# Patient Record
Sex: FEMALE | Race: BLACK OR AFRICAN AMERICAN | NOT HISPANIC OR LATINO | Employment: UNEMPLOYED | URBAN - METROPOLITAN AREA
[De-identification: names, ages, dates, MRNs, and addresses within clinical notes are randomized per-mention and may not be internally consistent; named-entity substitution may affect disease eponyms.]

---

## 2017-12-06 ENCOUNTER — HISTORICAL (OUTPATIENT)
Dept: ADMINISTRATIVE | Facility: HOSPITAL | Age: 22
End: 2017-12-06

## 2017-12-06 LAB
B-HCG FREE SERPL-ACNC: NORMAL MIU/ML
BILIRUB SERPL-MCNC: NEGATIVE MG/DL
BLOOD URINE, POC: NORMAL
CLARITY, POC UA: NORMAL
COLOR, POC UA: NORMAL
GLUCOSE UR QL STRIP: NEGATIVE
KETONES UR QL STRIP: NORMAL
LEUKOCYTE EST, POC UA: NEGATIVE
NITRITE, POC UA: NEGATIVE
PH, POC UA: 6.5
PROTEIN, POC: NEGATIVE
SPECIFIC GRAVITY, POC UA: 1
UROBILINOGEN, POC UA: NORMAL

## 2017-12-20 ENCOUNTER — HISTORICAL (OUTPATIENT)
Dept: ADMINISTRATIVE | Facility: HOSPITAL | Age: 22
End: 2017-12-20

## 2017-12-20 LAB
ABS NEUT (OLG): 9.06 X10(3)/MCL (ref 2.1–9.2)
BASOPHILS # BLD AUTO: 0.03 X10(3)/MCL
BASOPHILS NFR BLD AUTO: 0 % (ref 0–1)
BILIRUB SERPL-MCNC: NEGATIVE MG/DL
BLOOD URINE, POC: NORMAL
BUN SERPL-MCNC: 6 MG/DL (ref 7–18)
CALCIUM SERPL-MCNC: 8.9 MG/DL (ref 8.5–10.1)
CHLORIDE SERPL-SCNC: 104 MMOL/L (ref 98–107)
CLARITY, POC UA: CLEAR
CO2 SERPL-SCNC: 26 MMOL/L (ref 21–32)
COLOR, POC UA: YELLOW
CREAT SERPL-MCNC: 0.5 MG/DL (ref 0.6–1.3)
EOSINOPHIL # BLD AUTO: 0.03 10*3/UL
EOSINOPHIL NFR BLD AUTO: 0 % (ref 0–5)
ERYTHROCYTE [DISTWIDTH] IN BLOOD BY AUTOMATED COUNT: 13.6 % (ref 11.5–14.5)
GLUCOSE SERPL-MCNC: 82 MG/DL (ref 74–106)
GLUCOSE UR QL STRIP: NEGATIVE
HBV SURFACE AG SERPL QL IA: NEGATIVE
HCT VFR BLD AUTO: 37.7 % (ref 35–46)
HCV AB SERPL QL IA: NONREACTIVE
HGB BLD-MCNC: 12.4 GM/DL (ref 12–16)
HIV 1+2 AB+HIV1 P24 AG SERPL QL IA: NONREACTIVE
IMM GRANULOCYTES # BLD AUTO: 0.04 10*3/UL
IMM GRANULOCYTES NFR BLD AUTO: 0 %
KETONES UR QL STRIP: NEGATIVE
LEUKOCYTE EST, POC UA: NEGATIVE
LYMPHOCYTES # BLD AUTO: 1.64 X10(3)/MCL
LYMPHOCYTES NFR BLD AUTO: 14 % (ref 15–40)
MCH RBC QN AUTO: 29.9 PG (ref 26–34)
MCHC RBC AUTO-ENTMCNC: 32.9 GM/DL (ref 31–37)
MCV RBC AUTO: 90.8 FL (ref 80–100)
MONOCYTES # BLD AUTO: 0.63 X10(3)/MCL
MONOCYTES NFR BLD AUTO: 6 % (ref 4–12)
NEUTROPHILS # BLD AUTO: 9.06 X10(3)/MCL
NEUTROPHILS NFR BLD AUTO: 79 X10(3)/MCL
NITRITE, POC UA: NEGATIVE
PLATELET # BLD AUTO: 340 X10(3)/MCL (ref 130–400)
PMV BLD AUTO: 10 FL (ref 7.4–10.4)
POTASSIUM SERPL-SCNC: 4 MMOL/L (ref 3.5–5.1)
PROTEIN, POC: NORMAL
RBC # BLD AUTO: 4.15 X10(6)/MCL (ref 4–5.2)
SODIUM SERPL-SCNC: 138 MMOL/L (ref 136–145)
SPECIFIC GRAVITY, POC UA: 1.01
T PALLIDUM AB SER QL: NONREACTIVE
UROBILINOGEN, POC UA: NORMAL
WBC # SPEC AUTO: 11.4 X10(3)/MCL (ref 4.5–11)

## 2017-12-22 LAB — FINAL CULTURE: NORMAL

## 2018-01-15 LAB
BILIRUB SERPL-MCNC: NEGATIVE MG/DL
BLOOD URINE, POC: NEGATIVE
CLARITY, POC UA: CLEAR
COLOR, POC UA: YELLOW
GLUCOSE UR QL STRIP: NEGATIVE
KETONES UR QL STRIP: NEGATIVE
LEUKOCYTE EST, POC UA: NEGATIVE
NITRITE, POC UA: NEGATIVE
PH, POC UA: 6.5
PROTEIN, POC: NEGATIVE
SPECIFIC GRAVITY, POC UA: 1.01
UROBILINOGEN, POC UA: NORMAL

## 2018-02-16 ENCOUNTER — HISTORICAL (OUTPATIENT)
Dept: ADMINISTRATIVE | Facility: HOSPITAL | Age: 23
End: 2018-02-16

## 2018-02-16 LAB
ABS NEUT (OLG): 8.4 X10(3)/MCL (ref 2.1–9.2)
BASOPHILS # BLD AUTO: 0.03 X10(3)/MCL
BASOPHILS NFR BLD AUTO: 0 % (ref 0–1)
BILIRUB SERPL-MCNC: NEGATIVE MG/DL
BLOOD URINE, POC: NORMAL
CLARITY, POC UA: NORMAL
COLOR, POC UA: YELLOW
EOSINOPHIL # BLD AUTO: 0.07 10*3/UL
EOSINOPHIL NFR BLD AUTO: 1 % (ref 0–5)
ERYTHROCYTE [DISTWIDTH] IN BLOOD BY AUTOMATED COUNT: 13.3 % (ref 11.5–14.5)
GLUCOSE UR QL STRIP: NEGATIVE
HCT VFR BLD AUTO: 34.6 % (ref 35–46)
HGB BLD-MCNC: 12 GM/DL (ref 12–16)
IMM GRANULOCYTES # BLD AUTO: 0.05 10*3/UL
IMM GRANULOCYTES NFR BLD AUTO: 0 %
KETONES UR QL STRIP: NEGATIVE
LEUKOCYTE EST, POC UA: NORMAL
LYMPHOCYTES # BLD AUTO: 1.99 X10(3)/MCL
LYMPHOCYTES NFR BLD AUTO: 18 % (ref 15–40)
MCH RBC QN AUTO: 31.1 PG (ref 26–34)
MCHC RBC AUTO-ENTMCNC: 34.7 GM/DL (ref 31–37)
MCV RBC AUTO: 89.6 FL (ref 80–100)
MONOCYTES # BLD AUTO: 0.7 X10(3)/MCL
MONOCYTES NFR BLD AUTO: 6 % (ref 4–12)
NEUTROPHILS # BLD AUTO: 8.4 X10(3)/MCL
NEUTROPHILS NFR BLD AUTO: 75 X10(3)/MCL
NITRITE, POC UA: NEGATIVE
PH, POC UA: 6.5
PLATELET # BLD AUTO: 292 X10(3)/MCL (ref 130–400)
PMV BLD AUTO: 10 FL (ref 7.4–10.4)
PROTEIN, POC: NORMAL
RBC # BLD AUTO: 3.86 X10(6)/MCL (ref 4–5.2)
SPECIFIC GRAVITY, POC UA: 1.01
UROBILINOGEN, POC UA: NORMAL
WBC # SPEC AUTO: 11.2 X10(3)/MCL (ref 4.5–11)

## 2019-04-18 ENCOUNTER — HISTORICAL (OUTPATIENT)
Dept: ADMINISTRATIVE | Facility: HOSPITAL | Age: 24
End: 2019-04-18

## 2022-04-10 ENCOUNTER — HISTORICAL (OUTPATIENT)
Dept: ADMINISTRATIVE | Facility: HOSPITAL | Age: 27
End: 2022-04-10
Payer: MEDICAID

## 2022-04-27 VITALS
BODY MASS INDEX: 31.08 KG/M2 | WEIGHT: 198 LBS | HEIGHT: 67 IN | OXYGEN SATURATION: 99 % | SYSTOLIC BLOOD PRESSURE: 115 MMHG | DIASTOLIC BLOOD PRESSURE: 74 MMHG

## 2022-04-30 NOTE — PROGRESS NOTES
Health Maintenance     Pending (in the next year)        OverDue           Influenza Vaccine due  10/02/17  and every 1  year(s)        Due            Alcohol Misuse Screening due  12/20/17  and every 1  year(s)           Cervical Cancer Screening due  12/20/17  Variable frequency           Depression Screening due  12/20/17  and every 1  year(s)           Tetanus Vaccine due  12/20/17  and every 10  year(s)     Satisfied (in the past 1 year)        Satisfied            Blood Pressure Screening on  12/20/17.  Satisfied by Nicole Mascorro RN           Body Mass Index Check on  12/20/17.  Satisfied by Nicole Mascorro RN           Obesity Screening on  12/20/17.  Satisfied by Nicole Mascorro RN

## 2022-04-30 NOTE — PROGRESS NOTES
Patient:   Quintin Heredia             MRN: [id number]            FIN: [id number]               Age:   22 years     Sex:  Female     :  1995   Associated Diagnoses:   None   Author:   Jon Saul MD      Physician: CARLOS  Reason for Exam: INITIAL PRENATAL VISIT; DATING US  :1  Parity: 0  LMP: 10/18/2017  Gestational Age: 9w0d  EDC: 2018    Examination:  ABIMAEL: ADEQUATE  Fetal Tone:   Fetal Movement:   Fetal Heart Rate: 160  Fetus: SINGLE  Presentation:   Placenta:       Position: POSTERIOR      Grade:     Measurement:  CRL: 1.27cm  EGA: 7w3d  EDC: 2018    Comments: Early 1st trimester intrauterine pregnancy measuring CRL: 1.27cm; EGA: 7w3d; and an EDC of 2018. FHR: 160. Viable pregnancy.  ASSIGN EDC: 2018          This document has images

## 2022-04-30 NOTE — PROGRESS NOTES
Patient:   Quintin Heredia             MRN: [id number]            FIN: [id number]               Age:   22 years     Sex:  Female     :  1995   Associated Diagnoses:   IUP (intrauterine pregnancy), incidental   Author:   Efrain Miller MD      Chief Complaint   Pregnant      History of Present Illness     21 yo AA  @ 7wks3 d by 7wk3d U/S only (EDC 8/5/15)    New Issues:  mild nausea without vomiting    Chronic Issues: none      Histories   Gestational History:   - G1: Current   - G2: _   - G3: _   - G4: _  Gyn History:    - LMP:  UNSURE   - Age at menarche: 14 years   - Menstrual hx: regular, 5day cycles   - History of birth control: _    - History of STDs and/or Abnormal PAPs: _    Past Medical History:  noncontributory  Surgical History: none  Family History: father with HTN; 1/2 sister with DM (in 20s)  Social History:   GYNB HX:  prev normal PAP subjectively 2017; denies previous STIs; denies cutaneous genital lesions ever  Medications: possible early metronidazole for BV      Past Medical History:    No active or resolved past medical history items have been selected or recorded.   Family History:    No family history items have been selected or recorded.   Procedure history:    No active procedure history items have been selected or recorded.   Social History        Social & Psychosocial Habits    Alcohol  2017  Use: Never    Home/Environment  2016  Living situation: Home/Independent    Substance Abuse  2017  Use: Never    Tobacco    Comment: Denies - 2016 19:13 - LeJeune RN, [place] Jones        Health Status   Allergies:    Allergic Reactions (All)  No Known Allergies,    Allergies (1) Active Reaction  No Known Allergies None Documented     Current medications:  (Selected)   Prescriptions  Prescribed  Prenatal Plus oral tablet: 1 tab(s), Oral, Daily, [facility] Formulary Sub. with Generic Brand, # 90 tab(s), 3 Refill(s),    Home Medications (1) Active  Prenatal Plus  oral tablet 1 tab(s), Oral, Daily     Problem list:    All Problems  Caffeine user / SNOMED CT 3472594471 / Confirmed  Problem added by Discern Expert  First-trimester bleeding / SNOMED CT 40092252 / Confirmed  Encounter to determine fetal viability of pregnancy / SNOMED CT 10317458 / Confirmed  Pregnant / SNOMED CT 561595401 / Confirmed  Canceled: No Chronic Problems / [employer]ner NKP,    Active Problems (4)  Caffeine user   Encounter to determine fetal viability of pregnancy   First-trimester bleeding   Pregnant         Review of Systems     Antepartum specific     - Fetal movements: neg   - Vaginal bleeding: none since LMP subjectively   - Vaginal discharge:  slight, brown           Antepartum specific     denies: dysuira/ abnormal discharge /vag bleeding/ pelvic or abd pain /   reports: nause and vomiting , mild HA intermittently      Constitutional:  Fatigue, No fever, No chills.    Ear/Nose/Mouth/Throat:  Negative except as documented in history of present illness.    Respiratory:  Shortness of breath, No cough, No wheezing.    Cardiovascular:  No chest pain, No palpitations, No peripheral edema.    Gastrointestinal:  No nausea, No vomiting.    Genitourinary:  Negative except as documented in history of present illness, No dysuria, No lesions.    Gynecologic:  Negative except as documented in history of present illness.    Musculoskeletal:  No back pain, No joint pain, No muscle pain.    Integumentary:  Negative except as documented in history of present illness, No rash, No skin lesion.    Psychiatric:  Negative except as documented in history of present illness, No anxiety, No depression, Not suicidal.       Physical Examination   Vital Signs   12/20/2017 9:18 CST      Temperature Oral          36.7 DegC                             Temperature Oral (calculated)             98.06 DegF                             Peripheral Pulse Rate     87 bpm                             Respiratory Rate          18 br/min                              SpO2                      100 %                             Systolic Blood Pressure   130 mmHg                             Diastolic Blood Pressure  74 mmHg     Measurements from flowsheet : Measurements   12/20/2017 9:18 CST      Weight Dosing             90.26 kg                             Weight Measured           90.26 kg                             Weight Measured and Calculated in Lbs     198.99 lb                             Height/Length Dosing      170 cm                             Height/Length Measured    170 cm                             BSA Measured              2.06 m2                             Body Mass Index Measured  31.23 kg/m2     General:  Alert and oriented, No acute distress.    Eye:  Extraocular movements are intact, Normal conjunctiva.    HENT:  Normocephalic, Oral mucosa is moist.    Neck:  Supple, No thyromegaly.    Respiratory:  Lungs are clear to auscultation, Respirations are non-labored, Breath sounds are equal.    Cardiovascular:  Normal rate, Regular rhythm, Good pulses equal in all extremities, Normal peripheral perfusion, No edema.    Gastrointestinal:  Soft, Non-tender, Normal bowel sounds, gravid, gravid.    Genitourinary:          Labia: no lesions.         Cervix: Mucosa ( No inflammation ), no blood per os, No lesions.    Obstetric Exam     Uterus: fundal height, consistent with gestational age.     Rivers/ Baby A fetal evaluation: fetal movement, heart tones, assessment of fetal heart tracing.     Perineum.     Vulva.     Vagina: discharge (small amount, brown, not white), no lesions.     Cervix.     No contractions noted.     Musculoskeletal:  Normal strength, Normal gait.    Integumentary:  Warm, Dry, No rash.    Neurologic:  Alert, Oriented, No focal deficits, Normal deep tendon reflexes.    Cognition and Speech:  Oriented, Speech clear and coherent.    Psychiatric:  Cooperative, Appropriate mood & affect, Normal judgment, Non-suicidal.        Health Maintenance      Health Maintenance     Pending (in the next year)        OverDue           Influenza Vaccine due  10/02/17  and every 1  year(s)        Due            Alcohol Misuse Screening due  12/20/17  and every 1  year(s)           Cervical Cancer Screening due  12/20/17  Variable frequency           Depression Screening due  12/20/17  and every 1  year(s)           Tetanus Vaccine due  12/20/17  and every 10  year(s)     Satisfied (in the past 1 year)        Satisfied            Blood Pressure Screening on  12/20/17.  Satisfied by Nicole Mascorro RN           Body Mass Index Check on  12/20/17.  Satisfied by Nicole Mascorro RN.           Obesity Screening on  12/20/17.  Satisfied by Nicole Mascorro RN          Review / Management   Results review:  Lab results   12/20/2017 8:45 CST      Urine Color Urine Dipstick                Yellow                             Urine Appearance Urine Dipstick           Clear                             Specific Gravity Urine Dipstick           1.010                             Blood Urine Dipstick      Trace                             Glucose Urine Dipstick    Negative                             Ketones Urine Dipstick    Negative                             Protein Urine Dipstick    Trace                             Bilirubin Urine Dipstick  Negative                             Urobilinogen Urine Dipstick               0.2 mg/dl                             Leukocytes Urine Dipstick Negative                             Nitrite Urine Dipstick    Negative  .    Laboratory Results     Initial OB Labs(12/20/17):   - (12/7/17) Blood Type and Rh: O pos   - Antibody Screen: _   - CBC H/H: _   - HIV: _   - RPR: _   - GC: _   - CT: _   - HBsAg: _   - HCVAb: _   - Rubella: _   - Varicella: _   -  Culture: _   - Sickle Cell Screen: _   - PAP: _   - Influenza vaccine date: _    15-20 Weeks Lab    - Quad Screen: _    28 Week Lab    - 1H GTT: _    - Rhogam: _    - Date of  Tdap: _    - CBC H/H: _    - RPR: _    36 Week Lab    - CBC H/H: _    - RPR: _    - GBS Culture: _    - HIV: _    - Urine: GC: _      Radiology results      Ultrasound(s):        - 17 Initial U/S: assign EDC 8/5/15        - 20wk Anatomy scan:      Documentation reviewed:          -POC Urine Dip: reviewed at time of interview .       Impression and Plan   Diagnosis     IUP (intrauterine pregnancy), incidental ([id number]).       23 yo  @ 7wks3 d by 7wk3d U/S only (EDC 8/5/15)     1. Intrauterine Pregnancy    - PNVs perscribed electonically 17    - Urine dip:  1.010 tr prot, neg nitr, neg LE    - f/u 17  initial labs: ordered/PENDING          DISPO  - Assign to LOW RISK  PCP; RTC in 4wks  - NEEDS INFLU VACC  - anticipate     educated this visit: safe meds in pregnancy, PNVit compliance, complications of first trimester  strict ED precautions given for :ectopic/ SAB / VB/ pyelo /  dehydration / unable to tolerate po/ pelvic or abdominal pain

## 2022-04-30 NOTE — PROGRESS NOTES
Patient:   Quintin Heredia             MRN: [id number]            FIN: [id number]               Age:   22 years     Sex:  Female     :  1995   Associated Diagnoses:   None   Author:   Carlos ARTHUR, Jon EASON      Physician: CARLOS  Reason for Exam: 1ST TRIMESTER BLEEDING  : 1  Parity: 0  LMP: 10/18/2017  Gestational Age 7w0d  EDC: 2018    Examination:    Sac size: 1.36cm  EGA: 5w4d  CRL: no fetal pole identified; no YS identified    Comments: Very early 1st trimester intrauterine pregnancy. No fetal pole identified. GS measures 1.36cm and EGA of 5w4d. Recommend repeat ultrasound in 10-14days; quantitative beta hCG as clinically indicated.          This document has images

## 2022-09-06 ENCOUNTER — HOSPITAL ENCOUNTER (EMERGENCY)
Facility: HOSPITAL | Age: 27
Discharge: HOME | End: 2022-09-06
Attending: INTERNAL MEDICINE
Payer: MEDICAID

## 2022-09-06 VITALS
HEIGHT: 70 IN | TEMPERATURE: 98 F | HEART RATE: 75 BPM | BODY MASS INDEX: 30.17 KG/M2 | RESPIRATION RATE: 18 BRPM | WEIGHT: 210.75 LBS | OXYGEN SATURATION: 99 % | SYSTOLIC BLOOD PRESSURE: 120 MMHG | DIASTOLIC BLOOD PRESSURE: 77 MMHG

## 2022-09-06 DIAGNOSIS — R10.84 GENERALIZED ABDOMINAL PAIN: Primary | ICD-10-CM

## 2022-09-06 LAB
ALBUMIN SERPL-MCNC: 3.4 GM/DL (ref 3.5–5)
ALBUMIN/GLOB SERPL: 0.9 RATIO (ref 1.1–2)
ALP SERPL-CCNC: 119 UNIT/L (ref 40–150)
ALT SERPL-CCNC: 9 UNIT/L (ref 0–55)
APPEARANCE UR: CLEAR
AST SERPL-CCNC: 12 UNIT/L (ref 5–34)
B-HCG SERPL QL: NEGATIVE
BACTERIA #/AREA URNS AUTO: ABNORMAL /HPF
BASOPHILS # BLD AUTO: 0.02 X10(3)/MCL (ref 0–0.2)
BASOPHILS NFR BLD AUTO: 0.2 %
BILIRUB UR QL STRIP.AUTO: NEGATIVE MG/DL
BILIRUBIN DIRECT+TOT PNL SERPL-MCNC: 0.1 MG/DL
BUN SERPL-MCNC: 6 MG/DL (ref 7–18.7)
CALCIUM SERPL-MCNC: 9.4 MG/DL (ref 8.4–10.2)
CHLORIDE SERPL-SCNC: 107 MMOL/L (ref 98–107)
CO2 SERPL-SCNC: 23 MMOL/L (ref 22–29)
COLOR UR AUTO: YELLOW
CREAT SERPL-MCNC: 0.68 MG/DL (ref 0.55–1.02)
EOSINOPHIL # BLD AUTO: 0.14 X10(3)/MCL (ref 0–0.9)
EOSINOPHIL NFR BLD AUTO: 1.4 %
ERYTHROCYTE [DISTWIDTH] IN BLOOD BY AUTOMATED COUNT: 12.3 % (ref 11.5–17)
GFR SERPLBLD CREATININE-BSD FMLA CKD-EPI: >60 MLS/MIN/1.73/M2
GLOBULIN SER-MCNC: 3.6 GM/DL (ref 2.4–3.5)
GLUCOSE SERPL-MCNC: 116 MG/DL (ref 74–100)
GLUCOSE UR QL STRIP.AUTO: NEGATIVE MG/DL
HCT VFR BLD AUTO: 35.2 % (ref 37–47)
HGB BLD-MCNC: 11.9 GM/DL (ref 12–16)
IMM GRANULOCYTES # BLD AUTO: 0.02 X10(3)/MCL (ref 0–0.04)
IMM GRANULOCYTES NFR BLD AUTO: 0.2 %
KETONES UR QL STRIP.AUTO: NEGATIVE MG/DL
LEUKOCYTE ESTERASE UR QL STRIP.AUTO: NEGATIVE UNIT/L
LIPASE SERPL-CCNC: 20 U/L
LYMPHOCYTES # BLD AUTO: 3.19 X10(3)/MCL (ref 0.6–4.6)
LYMPHOCYTES NFR BLD AUTO: 32.8 %
MCH RBC QN AUTO: 30.5 PG (ref 27–31)
MCHC RBC AUTO-ENTMCNC: 33.8 MG/DL (ref 33–36)
MCV RBC AUTO: 90.3 FL (ref 80–94)
MONOCYTES # BLD AUTO: 0.53 X10(3)/MCL (ref 0.1–1.3)
MONOCYTES NFR BLD AUTO: 5.4 %
MUCOUS THREADS URNS QL MICRO: ABNORMAL /LPF
NEUTROPHILS # BLD AUTO: 5.8 X10(3)/MCL (ref 2.1–9.2)
NEUTROPHILS NFR BLD AUTO: 60 %
NITRITE UR QL STRIP.AUTO: NEGATIVE
PH UR STRIP.AUTO: 6.5 [PH]
PLATELET # BLD AUTO: 290 X10(3)/MCL (ref 130–400)
PMV BLD AUTO: 9.7 FL (ref 7.4–10.4)
POTASSIUM SERPL-SCNC: 3.6 MMOL/L (ref 3.5–5.1)
PROT SERPL-MCNC: 7 GM/DL (ref 6.4–8.3)
PROT UR QL STRIP.AUTO: NEGATIVE MG/DL
RBC # BLD AUTO: 3.9 X10(6)/MCL (ref 4.2–5.4)
RBC #/AREA URNS AUTO: ABNORMAL /HPF
RBC UR QL AUTO: ABNORMAL UNIT/L
SODIUM SERPL-SCNC: 139 MMOL/L (ref 136–145)
SP GR UR STRIP.AUTO: >=1.03
SQUAMOUS #/AREA URNS AUTO: ABNORMAL /HPF
UROBILINOGEN UR STRIP-ACNC: 1 MG/DL
WBC # SPEC AUTO: 9.7 X10(3)/MCL (ref 4.5–11.5)
WBC #/AREA URNS AUTO: ABNORMAL /HPF

## 2022-09-06 PROCEDURE — 96374 THER/PROPH/DIAG INJ IV PUSH: CPT

## 2022-09-06 PROCEDURE — 63600175 PHARM REV CODE 636 W HCPCS: Performed by: INTERNAL MEDICINE

## 2022-09-06 PROCEDURE — 85025 COMPLETE CBC W/AUTO DIFF WBC: CPT | Performed by: INTERNAL MEDICINE

## 2022-09-06 PROCEDURE — 36415 COLL VENOUS BLD VENIPUNCTURE: CPT | Performed by: INTERNAL MEDICINE

## 2022-09-06 PROCEDURE — 96375 TX/PRO/DX INJ NEW DRUG ADDON: CPT

## 2022-09-06 PROCEDURE — 96361 HYDRATE IV INFUSION ADD-ON: CPT

## 2022-09-06 PROCEDURE — 81001 URINALYSIS AUTO W/SCOPE: CPT | Performed by: INTERNAL MEDICINE

## 2022-09-06 PROCEDURE — 25000003 PHARM REV CODE 250: Performed by: INTERNAL MEDICINE

## 2022-09-06 PROCEDURE — 80053 COMPREHEN METABOLIC PANEL: CPT | Performed by: INTERNAL MEDICINE

## 2022-09-06 PROCEDURE — 99285 EMERGENCY DEPT VISIT HI MDM: CPT | Mod: 25

## 2022-09-06 PROCEDURE — 81025 URINE PREGNANCY TEST: CPT | Performed by: INTERNAL MEDICINE

## 2022-09-06 PROCEDURE — 83690 ASSAY OF LIPASE: CPT | Performed by: INTERNAL MEDICINE

## 2022-09-06 RX ORDER — ONDANSETRON 2 MG/ML
4 INJECTION INTRAMUSCULAR; INTRAVENOUS
Status: COMPLETED | OUTPATIENT
Start: 2022-09-06 | End: 2022-09-06

## 2022-09-06 RX ORDER — KETOROLAC TROMETHAMINE 30 MG/ML
15 INJECTION, SOLUTION INTRAMUSCULAR; INTRAVENOUS
Status: COMPLETED | OUTPATIENT
Start: 2022-09-06 | End: 2022-09-06

## 2022-09-06 RX ADMIN — ONDANSETRON 4 MG: 2 INJECTION INTRAMUSCULAR; INTRAVENOUS at 03:09

## 2022-09-06 RX ADMIN — KETOROLAC TROMETHAMINE 15 MG: 30 INJECTION, SOLUTION INTRAMUSCULAR; INTRAVENOUS at 04:09

## 2022-09-06 RX ADMIN — SODIUM CHLORIDE 1000 ML: 9 INJECTION, SOLUTION INTRAVENOUS at 03:09

## 2022-09-06 NOTE — DISCHARGE INSTRUCTIONS
See a Gyn doctor for follow-up and to reevaluate and treat as necessary.  As soon as possible    We do not provide OB services at [facility].      Take medicines as prescribed    See your family doctor in one to 2 days for further evaluation, workup, and treatment as necessary    Avoid driving or operating machinery while taking medicines as some medicines might cause drowsiness and may cause problems. Also pain medicines have potential of being addictive  so use Pain meds specially Narcotics Sparingly.    The exam and treatment you received in Emergency Room was for an urgent problem and NOT INTENDED AS COMPLETE CARE. It is important that you FOLLOW UP with a doctor for ongoing care. If your symptoms become WORSE or you DO NOT IMPROVE and you are unable to reach your health care provider, you should RETURN to the emergency department. The Emergency Room doctor has provided a PRELIMINARY INTERPRETATION of all your STUDIES. A final interpretation may be done after you are discharged. IF A CHANGE in your diagnosis or treatment is needed WE WILL CONTACT YOU. It is critical that we have a CURRENT PHONE NUMBER FOR YOU.

## 2022-09-06 NOTE — ED PROVIDER NOTES
2022         3:31 AM    Source of History:  History obtained from the patient.       Chief complaint:  From Nurse Triage:  Abdominal Pain (Pt c/o abd pain and back pain since 10 pm.)    HPI:  Quintin Heredia is a 27 y.o. female presenting with Abdominal Pain (Pt c/o abd pain and back pain since 10 pm.)       Patient with history of the  AB2 on birth control comes to the emergency with complaint of cramping abdominal pain which started this afternoon.  She says although she does not get usually a period, but today she was spotting and then in the evening she started having cramps in the abdomen.  Denies any diarrhea, denies any vomiting, denies any other complaints whatsoever    Review of Systems   Constitutional symptoms:  No Fever. No Chills    Skin symptoms:  No Rash.    Eye symptoms:  No Visual disturbance reported.   ENMT symptoms:  No Sore throat,    Respiratory symptoms:  No Shortness of Breath, no Cough, no Wheezing.    Cardiovascular symptoms:  No Chest Pain, No Palpitations, No Tachycardia.    Gastrointestinal symptoms:  Abdominal cramping Pain,  Nausea, No Vomiting, No Diarrhea, No Constipation.    Genitourinary symptoms:  No Dysuria,  vaginal spotting  Musculoskeletal symptoms:  No Back pain,    Neurologic symptoms:  No Headache, No Dizziness.    Psychiatric symptoms:  No Anxiety, No Depression, No Substance Abuse.              Additional review of systems information: Patient Denies Any Other Complaints.  All Other Systems Reviewed With Patient And Negative.    ALLEGIES:  Review of patient's allergies indicates:  No Known Allergies    HOME MEDICINES:  No current facility-administered medications for this encounter.    Current Outpatient Medications:     ranitidine (ZANTAC) 150 MG tablet, Take 1 tablet (150 mg total) by mouth 2 (two) times daily., Disp: 60 tablet, Rfl: 11    PMH:  As per HPI and below:    Reviewed and updated in chart.    PAST MEDICAL HISTORY:  Past Medical History:   Diagnosis  Date    COVID-19 01/06/2022        PAST SURGICAL HISTORY:  History reviewed. No pertinent surgical history.    SOCIAL HISTORY:       FAMILY HISTORY:  History reviewed. No pertinent family history.     PROBLEM LIST:  There is no problem list on file for this patient.       PHYSICAL EXAM:      ED Triage Vitals [09/06/22 0310]   BP (!) 148/90   Pulse 81   Resp 20   Temp 98.3 °F (36.8 °C)   SpO2 99 %        Vital Signs: Reviewed As In Chart.  General:  Alert, No Cardiorespiratory Distress Noted.   Skin: Normal For Ethnic Origin  Eye:  Extraocular Movements Are Intact.   Cardiovascular:  Regular Rate And Rhythm, No Murmur, No Pedal Edema.    Respiratory:  Respirations Nonlabored, No Respiratory Distress, Good Bilateral Air Entry, No Rales, No Rhonchi.    Musculoskeletal:  No Gross Deformity Noted.   Gastrointestinal:  Soft, Non Distended, Tender in all 4 quadrants essentially, Normal Bowel Sounds.    Neurological:  Alert And Oriented To Person, Place, Time, And Situation, Normal Motor Observed, Normal Speech Observed.    Psychiatric:  Cooperative, Appropriate Mood & Affect.    INITIAL IMPRESSION/ DIFFERENTIAL DX:      MEDICAL DECISION MAKING:      Reviewed Nurses Note. Reviewed Vital Signs.     Reviewed Pertinent old records, History and updated as necessary.    27 y.o. female with Abdominal Pain (Pt c/o abd pain and back pain since 10 pm.)    Patient with abdominal pain cramping, spotting started today, is on birth control and usually she does not get periods.  Denies any other complaints.  She says the pain got better in between but it started cramping again and it is all over the abdomen and goes to the back.    ED WORKUP AND COURSE:  ED ORDERS:  Orders Placed This Encounter   Procedures    CT Renal Stone Study ABD Pelvis WO    CBC W/ AUTO DIFFERENTIAL    Comp. Metabolic Panel    Lipase    Urinalysis, Reflex to Urine Culture Urine, Clean Catch    CBC with Differential    Pregnancy, urine rapid    Urinalysis,  Microscopic    Diet NPO    Vital signs    Insert peripheral IV       Medications   sodium chloride 0.9% bolus 1,000 mL (0 mLs Intravenous Stopped 9/6/22 0445)   ondansetron injection 4 mg (4 mg Intravenous Given 9/6/22 0345)   ketorolac injection 15 mg (15 mg Intravenous Given 9/6/22 0422)                ED LABS ORDERED AND REVIEWED:  Admission on 09/06/2022   Component Date Value Ref Range Status    Sodium Level 09/06/2022 139  136 - 145 mmol/L Final    Potassium Level 09/06/2022 3.6  3.5 - 5.1 mmol/L Final    Chloride 09/06/2022 107  98 - 107 mmol/L Final    Carbon Dioxide 09/06/2022 23  22 - 29 mmol/L Final    Glucose Level 09/06/2022 116 (H)  74 - 100 mg/dL Final    Blood Urea Nitrogen 09/06/2022 6.0 (L)  7.0 - 18.7 mg/dL Final    Creatinine 09/06/2022 0.68  0.55 - 1.02 mg/dL Final    Calcium Level Total 09/06/2022 9.4  8.4 - 10.2 mg/dL Final    Protein Total 09/06/2022 7.0  6.4 - 8.3 gm/dL Final    Albumin Level 09/06/2022 3.4 (L)  3.5 - 5.0 gm/dL Final    Globulin 09/06/2022 3.6 (H)  2.4 - 3.5 gm/dL Final    Albumin/Globulin Ratio 09/06/2022 0.9 (L)  1.1 - 2.0 ratio Final    Bilirubin Total 09/06/2022 0.1  <=1.5 mg/dL Final    Alkaline Phosphatase 09/06/2022 119  40 - 150 unit/L Final    Alanine Aminotransferase 09/06/2022 9  0 - 55 unit/L Final    Aspartate Aminotransferase 09/06/2022 12  5 - 34 unit/L Final    eGFR 09/06/2022 >60  mls/min/1.73/m2 Final    Lipase Level 09/06/2022 20  <=60 U/L Final    Color, UA 09/06/2022 Yellow  Yellow, Colorless, Other, Clear Final    Appearance, UA 09/06/2022 Clear  Clear Final    Specific Gravity, UA 09/06/2022 >=1.030   Final    pH, UA 09/06/2022 6.5  5.0, 5.5, 6.0, 6.5, 7.0, 7.5, 8.0, 8.5 Final    Protein, UA 09/06/2022 Negative  Negative, 300  mg/dL Final    Glucose, UA 09/06/2022 Negative  Negative, Normal mg/dL Final    Ketones, UA 09/06/2022 Negative  Negative, +1, +2, +3, +4, +5, >=160, >=80 mg/dL Final    Blood, UA 09/06/2022 Trace-Intact (A)  Negative unit/L  Final    Bilirubin, UA 09/06/2022 Negative  Negative mg/dL Final    Urobilinogen, UA 09/06/2022 1.0  0.2, 1.0, Normal mg/dL Final    Nitrites, UA 09/06/2022 Negative  Negative Final    Leukocyte Esterase, UA 09/06/2022 Negative  Negative, 75  unit/L Final    WBC 09/06/2022 9.7  4.5 - 11.5 x10(3)/mcL Final    RBC 09/06/2022 3.90 (L)  4.20 - 5.40 x10(6)/mcL Final    Hgb 09/06/2022 11.9 (L)  12.0 - 16.0 gm/dL Final    Hct 09/06/2022 35.2 (L)  37.0 - 47.0 % Final    MCV 09/06/2022 90.3  80.0 - 94.0 fL Final    MCH 09/06/2022 30.5  27.0 - 31.0 pg Final    MCHC 09/06/2022 33.8  33.0 - 36.0 mg/dL Final    RDW 09/06/2022 12.3  11.5 - 17.0 % Final    Platelet 09/06/2022 290  130 - 400 x10(3)/mcL Final    MPV 09/06/2022 9.7  7.4 - 10.4 fL Final    Neut % 09/06/2022 60.0  % Final    Lymph % 09/06/2022 32.8  % Final    Mono % 09/06/2022 5.4  % Final    Eos % 09/06/2022 1.4  % Final    Basophil % 09/06/2022 0.2  % Final    Lymph # 09/06/2022 3.19  0.6 - 4.6 x10(3)/mcL Final    Neut # 09/06/2022 5.8  2.1 - 9.2 x10(3)/mcL Final    Mono # 09/06/2022 0.53  0.1 - 1.3 x10(3)/mcL Final    Eos # 09/06/2022 0.14  0 - 0.9 x10(3)/mcL Final    Baso # 09/06/2022 0.02  0 - 0.2 x10(3)/mcL Final    IG# 09/06/2022 0.02  0 - 0.04 x10(3)/mcL Final    IG% 09/06/2022 0.2  % Final    Beta hCG Qualitative, Urine 09/06/2022 Negative  Negative Final    Bacteria, UA 09/06/2022 Few (A)  None Seen, Rare, Occasional /HPF Final    Mucous, UA 09/06/2022 Small (A)  None Seen /LPF Final    RBC, UA 09/06/2022 3-5  None Seen, 0-2, 3-5, 0-5 /HPF Final    WBC, UA 09/06/2022 None Seen  None Seen, 0-2, 3-5, 0-5 /HPF Final    Squamous Epithelial Cells, UA 09/06/2022 Few (A)  None Seen, Rare, Occasional, Occ /HPF Final       RADIOLOGY STUDIES ORDERED AND REVIEWED:  Imaging Results              CT Renal Stone Study ABD Pelvis WO (Preliminary result)  Result time 09/06/22 04:39:33      Preliminary result by Frank Hackett Jr., MD (09/06/22 04:39:33)                    Narrative:    START OF REPORT:  Technique: CT of the abdomen and pelvis was performed with axial images as well as sagittal and coronal reconstruction images without intravenous contrast.    Comparison: None available.    Clinical History: Abdominal pain on both sides.    Dosage Information: Automated Exposure Control was utilized.    Findings:  Thorax:  Lungs: The visualized lung bases appear unremarkable.  Pleura: No effusions or thickening.  Heart: The heart size is within normal limits.  Abdomen:  Abdominal Wall: There is a small umbilical hernia which contains mesenteric fat.  Liver: The liver appears unremarkable.  Biliary System: No extrahepatic biliary duct dilatation is seen.  Gallbladder: The gallbladder appears unremarkable.  Pancreas: The pancreas appears unremarkable.  Spleen: The spleen appears unremarkable.  Adrenals: The adrenal glands appear unremarkable.  Kidneys: The kidneys appear unremarkable with no stones cysts masses or hydronephrosis.  Aorta: The abdominal aorta appears unremarkable.  IVC: Unremarkable.  Bowel:  Esophagus: The visualized esophagus appears unremarkable.  Stomach: The stomach appears unremarkable.  Duodenum: Unremarkable appearing duodenum.  Small Bowel: The small bowel appears unremarkable.  Colon: Nondistended.  Appendix: The appendix appears unremarkable.  Peritoneum: No intraperitoneal free air or ascites is seen.    Pelvis:  Bladder: The bladder is nondistended and cannot be definitively evaluated.  Female:  Uterus: The uterus appears unremarkable.  Ovaries: No adnexal masses are seen.    Bony structures:  Dorsal Spine: The visualized dorsal spine appears unremarkable.      Impression:  1. No acute intraabdominal or pelvic pathology is identified. Details as above.                          Preliminary result by Interface, Rad Results In (09/06/22 04:39:33)                   Narrative:    START OF REPORT:  Technique: CT of the abdomen and pelvis was performed with  axial images as well as sagittal and coronal reconstruction images without intravenous contrast.    Comparison: None available.    Clinical History: Abdominal pain on both sides.    Dosage Information: Automated Exposure Control was utilized.    Findings:  Thorax:  Lungs: The visualized lung bases appear unremarkable.  Pleura: No effusions or thickening.  Heart: The heart size is within normal limits.  Abdomen:  Abdominal Wall: There is a small umbilical hernia which contains mesenteric fat.  Liver: The liver appears unremarkable.  Biliary System: No extrahepatic biliary duct dilatation is seen.  Gallbladder: The gallbladder appears unremarkable.  Pancreas: The pancreas appears unremarkable.  Spleen: The spleen appears unremarkable.  Adrenals: The adrenal glands appear unremarkable.  Kidneys: The kidneys appear unremarkable with no stones cysts masses or hydronephrosis.  Aorta: The abdominal aorta appears unremarkable.  IVC: Unremarkable.  Bowel:  Esophagus: The visualized esophagus appears unremarkable.  Stomach: The stomach appears unremarkable.  Duodenum: Unremarkable appearing duodenum.  Small Bowel: The small bowel appears unremarkable.  Colon: Nondistended.  Appendix: The appendix appears unremarkable.  Peritoneum: No intraperitoneal free air or ascites is seen.    Pelvis:  Bladder: The bladder is nondistended and cannot be definitively evaluated.  Female:  Uterus: The uterus appears unremarkable.  Ovaries: No adnexal masses are seen.    Bony structures:  Dorsal Spine: The visualized dorsal spine appears unremarkable.      Impression:  1. No acute intraabdominal or pelvic pathology is identified. Details as above.                                        ED COURSE AND REEVALUATIONS:  Vitals:    09/06/22 0310   BP: (!) 148/90   Pulse: 81   Resp: 20   Temp: 98.3 °F (36.8 °C)       PROCEDURES PERFORMED IN ED:  Procedures    ED Course as of 09/06/22 0510   Tue Sep 06, 2022   0503 Patient's workup in the emergency  room is essentially negative for any acute abnormality at this time, she has diffuse cramping abdominal pain which goes to the back.  She has also spotting, it may be just dysmenorrhea.  She does not have any other particular intervention needed at this time I will put her on ranitidine for acid reduction and see if that helps her.  I advised her to see her family doctor and gyn for follow-up. [GQ]      ED Course User Index  [GQ] Teressa Ward MD              DIAGNOSTIC IMPRESSION:      1. Generalized abdominal pain         ED Disposition Condition    Discharge Stable               Medication List        START taking these medications      ranitidine 150 MG tablet  Commonly known as: ZANTAC  Take 1 tablet (150 mg total) by mouth 2 (two) times daily.               Where to Get Your Medications        These medications were sent to [id number] - [place] - [address]  [address] [address]      Phone: [phone]   ranitidine 150 MG tablet           Follow-up Information       PMD In 2 days.                              ED Prescriptions       Medication Sig Dispense Start Date End Date Auth. Provider    ranitidine (ZANTAC) 150 MG tablet Take 1 tablet (150 mg total) by mouth 2 (two) times daily. 60 tablet 9/6/2022 9/6/2023 Teressa Ward MD          Follow-up Information       Follow up With Specialties Details Why Contact Info    PMD  In 2 days                 Teressa Ward MD  09/06/22 0079

## 2022-09-17 ENCOUNTER — HISTORICAL (OUTPATIENT)
Dept: ADMINISTRATIVE | Facility: HOSPITAL | Age: 27
End: 2022-09-17
Payer: MEDICAID

## 2023-01-05 ENCOUNTER — HOSPITAL ENCOUNTER (OUTPATIENT)
Dept: RADIOLOGY | Facility: HOSPITAL | Age: 28
Discharge: HOME | End: 2023-01-05
Attending: FAMILY MEDICINE
Payer: MEDICAID

## 2023-01-05 DIAGNOSIS — Z11.1 DELAYED REACTION PPD: ICD-10-CM

## 2023-01-05 PROCEDURE — 71046 X-RAY EXAM CHEST 2 VIEWS: CPT | Mod: TC

## 2023-05-16 ENCOUNTER — HOSPITAL ENCOUNTER (EMERGENCY)
Facility: HOSPITAL | Age: 28
Discharge: LEFT BEFORE DISCHARGE | End: 2023-05-16
Payer: MEDICAID

## 2023-05-16 VITALS
HEART RATE: 88 BPM | WEIGHT: 213 LBS | SYSTOLIC BLOOD PRESSURE: 136 MMHG | RESPIRATION RATE: 16 BRPM | TEMPERATURE: 98 F | OXYGEN SATURATION: 100 % | BODY MASS INDEX: 32.28 KG/M2 | HEIGHT: 68 IN | DIASTOLIC BLOOD PRESSURE: 83 MMHG

## 2023-05-16 LAB
ALBUMIN SERPL-MCNC: 3.4 G/DL (ref 3.5–5)
ALBUMIN/GLOB SERPL: 0.9 RATIO (ref 1.1–2)
ALP SERPL-CCNC: 111 UNIT/L (ref 40–150)
ALT SERPL-CCNC: 84 UNIT/L (ref 0–55)
APPEARANCE UR: CLEAR
AST SERPL-CCNC: 65 UNIT/L (ref 5–34)
B-HCG SERPL QL: NEGATIVE
BACTERIA #/AREA URNS AUTO: NORMAL /HPF
BASOPHILS # BLD AUTO: 0.04 X10(3)/MCL
BASOPHILS NFR BLD AUTO: 0.4 %
BILIRUB UR QL STRIP.AUTO: NEGATIVE MG/DL
BILIRUBIN DIRECT+TOT PNL SERPL-MCNC: 0.2 MG/DL
BUN SERPL-MCNC: 7 MG/DL (ref 7–18.7)
CALCIUM SERPL-MCNC: 9.3 MG/DL (ref 8.4–10.2)
CHLORIDE SERPL-SCNC: 104 MMOL/L (ref 98–107)
CO2 SERPL-SCNC: 27 MMOL/L (ref 22–29)
COLOR UR: YELLOW
CREAT SERPL-MCNC: 0.74 MG/DL (ref 0.55–1.02)
EOSINOPHIL # BLD AUTO: 0.08 X10(3)/MCL (ref 0–0.9)
EOSINOPHIL NFR BLD AUTO: 0.8 %
ERYTHROCYTE [DISTWIDTH] IN BLOOD BY AUTOMATED COUNT: 12.9 % (ref 11.5–17)
GFR SERPLBLD CREATININE-BSD FMLA CKD-EPI: >60 MLS/MIN/1.73/M2
GLOBULIN SER-MCNC: 3.9 GM/DL (ref 2.4–3.5)
GLUCOSE SERPL-MCNC: 107 MG/DL (ref 74–100)
GLUCOSE UR QL STRIP.AUTO: NEGATIVE MG/DL
HCT VFR BLD AUTO: 36.9 % (ref 37–47)
HGB BLD-MCNC: 11.8 G/DL (ref 12–16)
IMM GRANULOCYTES # BLD AUTO: 0.03 X10(3)/MCL (ref 0–0.04)
IMM GRANULOCYTES NFR BLD AUTO: 0.3 %
KETONES UR QL STRIP.AUTO: ABNORMAL MG/DL
LEUKOCYTE ESTERASE UR QL STRIP.AUTO: ABNORMAL UNIT/L
LIPASE SERPL-CCNC: 14 U/L
LYMPHOCYTES # BLD AUTO: 2.79 X10(3)/MCL (ref 0.6–4.6)
LYMPHOCYTES NFR BLD AUTO: 28.7 %
MCH RBC QN AUTO: 30 PG (ref 27–31)
MCHC RBC AUTO-ENTMCNC: 32 G/DL (ref 33–36)
MCV RBC AUTO: 93.9 FL (ref 80–94)
MONOCYTES # BLD AUTO: 0.61 X10(3)/MCL (ref 0.1–1.3)
MONOCYTES NFR BLD AUTO: 6.3 %
NEUTROPHILS # BLD AUTO: 6.16 X10(3)/MCL (ref 2.1–9.2)
NEUTROPHILS NFR BLD AUTO: 63.5 %
NITRITE UR QL STRIP.AUTO: NEGATIVE
PH UR STRIP.AUTO: 7 [PH]
PLATELET # BLD AUTO: 336 X10(3)/MCL (ref 130–400)
PMV BLD AUTO: 9.6 FL (ref 7.4–10.4)
POTASSIUM SERPL-SCNC: 3.6 MMOL/L (ref 3.5–5.1)
PROT SERPL-MCNC: 7.3 GM/DL (ref 6.4–8.3)
PROT UR QL STRIP.AUTO: ABNORMAL MG/DL
RBC # BLD AUTO: 3.93 X10(6)/MCL (ref 4.2–5.4)
RBC #/AREA URNS AUTO: NORMAL /HPF
RBC UR QL AUTO: ABNORMAL UNIT/L
SODIUM SERPL-SCNC: 139 MMOL/L (ref 136–145)
SP GR UR STRIP.AUTO: 1.02
SQUAMOUS #/AREA URNS AUTO: NORMAL /HPF
UROBILINOGEN UR STRIP-ACNC: 2 MG/DL
WBC # SPEC AUTO: 9.71 X10(3)/MCL (ref 4.5–11.5)
WBC #/AREA URNS AUTO: NORMAL /HPF

## 2023-05-16 PROCEDURE — 81001 URINALYSIS AUTO W/SCOPE: CPT | Performed by: PHYSICIAN ASSISTANT

## 2023-05-16 PROCEDURE — 81025 URINE PREGNANCY TEST: CPT | Performed by: PHYSICIAN ASSISTANT

## 2023-05-16 PROCEDURE — 85025 COMPLETE CBC W/AUTO DIFF WBC: CPT | Performed by: PHYSICIAN ASSISTANT

## 2023-05-16 PROCEDURE — 99900041 HC LEFT WITHOUT BEING SEEN- EMERGENCY

## 2023-05-16 PROCEDURE — 83690 ASSAY OF LIPASE: CPT | Performed by: PHYSICIAN ASSISTANT

## 2023-05-16 PROCEDURE — 80053 COMPREHEN METABOLIC PANEL: CPT | Performed by: PHYSICIAN ASSISTANT

## 2023-05-16 NOTE — FIRST PROVIDER EVALUATION
"Medical screening examination initiated.  I have conducted a focused provider triage encounter, findings are as follows:    Chief Complaint   Patient presents with    Abdominal Pain     Abd pain for 20 min after vomitting X2     Brief history of present illness:  27 y.o. female presents to the ED with midline abdominal pain onset 20 min [place]LE[place] with vomiting x2. No meds taken. LMP at beginning of May    Vitals:    05/16/23 1519   BP: 136/83   Pulse: 88   Resp: 16   Temp: 97.6 °F (36.4 °C)   SpO2: 100%   Weight: 96.6 kg (213 lb)   Height: 5' 8" (1.727 m)       Pertinent physical exam: Awake, alert, ambulatory, non-labored respirations    Brief workup plan:  labs, UA    Preliminary workup initiated; this workup will be continued and followed by the physician or advanced practice provider that is assigned to the patient when roomed.  "

## 2023-05-17 ENCOUNTER — HOSPITAL ENCOUNTER (EMERGENCY)
Facility: HOSPITAL | Age: 28
Discharge: HOME | End: 2023-05-17
Attending: EMERGENCY MEDICINE
Payer: MEDICAID

## 2023-05-17 VITALS
DIASTOLIC BLOOD PRESSURE: 83 MMHG | TEMPERATURE: 98 F | SYSTOLIC BLOOD PRESSURE: 169 MMHG | BODY MASS INDEX: 31.78 KG/M2 | RESPIRATION RATE: 18 BRPM | OXYGEN SATURATION: 100 % | WEIGHT: 209 LBS | HEART RATE: 77 BPM

## 2023-05-17 DIAGNOSIS — K80.20 CALCULUS OF GALLBLADDER WITHOUT CHOLECYSTITIS WITHOUT OBSTRUCTION: ICD-10-CM

## 2023-05-17 DIAGNOSIS — K80.50 BILIARY COLIC: Primary | ICD-10-CM

## 2023-05-17 DIAGNOSIS — R10.9 ABDOMINAL PAIN: ICD-10-CM

## 2023-05-17 LAB
APPEARANCE UR: CLEAR
B-HCG SERPL QL: NEGATIVE
BACTERIA #/AREA URNS AUTO: ABNORMAL /HPF
BILIRUB UR QL STRIP.AUTO: ABNORMAL MG/DL
CAOX CRY URNS QL MICRO: ABNORMAL /HPF
COLOR UR: YELLOW
GLUCOSE UR QL STRIP.AUTO: ABNORMAL MG/DL
KETONES UR QL STRIP.AUTO: ABNORMAL MG/DL
LEUKOCYTE ESTERASE UR QL STRIP.AUTO: ABNORMAL UNIT/L
MUCOUS THREADS URNS QL MICRO: ABNORMAL /LPF
NITRITE UR QL STRIP.AUTO: NEGATIVE
PH UR STRIP.AUTO: 6 [PH]
PROT UR QL STRIP.AUTO: ABNORMAL MG/DL
RBC #/AREA URNS AUTO: ABNORMAL /HPF
RBC UR QL AUTO: ABNORMAL UNIT/L
SP GR UR STRIP.AUTO: >=1.03
SQUAMOUS #/AREA URNS AUTO: ABNORMAL /HPF
UROBILINOGEN UR STRIP-ACNC: 2 MG/DL
WBC #/AREA URNS AUTO: ABNORMAL /HPF

## 2023-05-17 PROCEDURE — 81001 URINALYSIS AUTO W/SCOPE: CPT | Performed by: EMERGENCY MEDICINE

## 2023-05-17 PROCEDURE — 87077 CULTURE AEROBIC IDENTIFY: CPT | Performed by: EMERGENCY MEDICINE

## 2023-05-17 PROCEDURE — 81025 URINE PREGNANCY TEST: CPT | Performed by: EMERGENCY MEDICINE

## 2023-05-17 PROCEDURE — 99284 EMERGENCY DEPT VISIT MOD MDM: CPT | Mod: 25

## 2023-05-17 NOTE — DISCHARGE INSTRUCTIONS
[place] diet lots of liquids    Return for any emergency problem    Please contact surgeon and make arrangements for evaluation and if needed a possible elective gallbladder removal

## 2023-05-17 NOTE — ED PROVIDER NOTES
Encounter Date: 2023       History     Chief Complaint   Patient presents with    Abdominal Pain     C/o intermittent abdominal pain & cramping for months. Pt had her birth control removed last week thinking that it was the problem but states that the pain continues. Pt reports vomiting x 4 yesterday due to the pain.     Patient presents emergency department with abdominal pain crampy discomfort she indicates mid epigastric area down to umbilicus suprapubic area.  It has been going on since 2022.  She says she saw her doctor once long time ago she can not remember she said she did come yesterday.  But she had to leave and go take care of her children get home from school so she just came back this morning after she dropped him off at school.  Ms. Heredia says she is not taken anything for this maybe some Tylenol 1 time it has not really changed not worse not better intermittently she has vomiting yesterday she did vomit 4 times.  She denies any associated fever at any time.  She denies abnormal vaginal bleeding or discharge.  She said she felt like this was related to her Norplant she had in her left arm so she had that removed last week but she still having the same symptoms.    Past medical history she denies any chronic medical problems.  She denies any daily medication    No known drug allergies    She had the nor plant in her left arm had it removed last week.  She said that is her only surgical history    No tobacco no alcohol no drugs said she is  nobody else at the home is sick with anything.  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    Patient has had a COVID vaccine and a flu shot no pneumonia no tetanus shots.      Primary healthcare provider Dr. Duque.   2 para 2 last menstrual cycle was May 3rd 5 days normal.      Family history mom and dad both alive both healthy.  No chronic medical issues.    Review of patient's allergies indicates:  No Known Allergies  Past Medical History:    Diagnosis Date    COVID-19 01/06/2022     History reviewed. No pertinent surgical history.  History reviewed. No pertinent family history.  Social History     Tobacco Use    Smoking status: Never    Smokeless tobacco: Never   Substance Use Topics    Alcohol use: Never    Drug use: Never     Review of Systems   Constitutional: Negative.  Negative for appetite change and unexpected weight change.   HENT: Negative.     Eyes: Negative.  Negative for visual disturbance.   Respiratory:  Negative for cough, chest tightness and stridor.    Cardiovascular:  Negative for chest pain.   Gastrointestinal:  Positive for abdominal pain, nausea and vomiting. Negative for abdominal distention, anal bleeding, blood in stool, constipation and diarrhea.   Endocrine: Negative.    Genitourinary:  Positive for difficulty urinating. Negative for dysuria, frequency, hematuria and urgency.   Musculoskeletal:  Positive for back pain (Sometimes it abdominal pain goes through to her back).   Skin: Negative.    Allergic/Immunologic: Negative.    Neurological: Negative.    Hematological: Negative.    Psychiatric/Behavioral: Negative.     All other systems reviewed and are negative.    Physical Exam     Initial Vitals [05/17/23 0841]   BP Pulse Resp Temp SpO2   (!) 169/83 77 18 97.6 °F (36.4 °C) 100 %      MAP       --         Physical Exam    Nursing note and vitals reviewed.  Constitutional: She appears well-developed and well-nourished.   No apparent distress   HENT:   Head: Normocephalic and atraumatic.   Right Ear: Tympanic membrane and external ear normal.   Left Ear: Tympanic membrane and external ear normal.   Nose: Nose normal.   Mouth/Throat: Oropharynx is clear and moist and mucous membranes are normal.   Eyes: EOM are normal. Pupils are equal, round, and reactive to light.   Neck: Neck supple. No thyromegaly present. No tracheal deviation present. No JVD present.   Normal range of motion.  Cardiovascular:  Normal rate, regular rhythm  and normal heart sounds.     Exam reveals no gallop and no friction rub.       No murmur heard.  Pulmonary/Chest: Breath sounds normal. No stridor. No respiratory distress. She has no wheezes. She has no rhonchi. She has no rales. She exhibits no tenderness.   Abdominal: Abdomen is soft. Bowel sounds are normal. She exhibits no distension and no mass. There is abdominal tenderness (Minimum direct epigastric area tenderness right upper quadrant left upper quadrant equally mildly tender).   2+ femoral pulses equal no pulsatile mass in the abdomen nontender suprapubic area   Genitourinary:    Genitourinary Comments: No CVA tenderness no midline thoracic cervical or lumbar sacral spine tenderness no step-off     Musculoskeletal:         General: No tenderness or edema. Normal range of motion.      Cervical back: Normal range of motion and neck supple.     Lymphadenopathy:     She has no cervical adenopathy.   Neurological: She is alert and oriented to person, place, and time. She has normal strength and normal reflexes. No cranial nerve deficit. GCS score is 15. GCS eye subscore is 4. GCS verbal subscore is 5. GCS motor subscore is 6.   Skin: Skin is warm and dry. Capillary refill takes 2 to 3 seconds. No rash noted.   Psychiatric: She has a normal mood and affect. Her behavior is normal. Judgment and thought content normal.       ED Course   Procedures  Labs Reviewed   URINALYSIS, REFLEX TO URINE CULTURE - Abnormal; Notable for the following components:       Result Value    Protein, UA 2+ (*)     Glucose, UA Trace (*)     Ketones, UA Trace (*)     Blood, UA 2+ (*)     Bilirubin, UA 3+ (*)     Urobilinogen, UA 2.0 (*)     Leukocyte Esterase, UA 1+ (*)     All other components within normal limits   URINALYSIS, MICROSCOPIC - Abnormal; Notable for the following components:    Bacteria, UA Few (*)     Mucous, UA Large (*)     Calcium Oxalate Crystals, UA Few (*)     RBC, UA 21-50 (*)     WBC, UA 51-99 (*)     Squamous  Epithelial Cells, UA Moderate (*)     All other components within normal limits   HCG QUALITATIVE URINE - Normal   CULTURE, URINE          Imaging Results              US Abdomen Complete (Final result)  Result time 05/17/23 11:43:48      Final result by Julio C Hudson MD (05/17/23 11:43:48)                   Impression:      1. Mildly distended gallbladder with multiple gallstones.  No wall thickening or pericholecystic fluid is seen.  Sonographic Romero sign is positive.  The common bile duct is not dilated.  A hepatobiliary scan would allow further evaluation  2. Less than optimal visualization of the pancreas      Electronically signed by: Julio C Hudson  Date:    05/17/2023  Time:    11:43               Narrative:    EXAMINATION:  US ABDOMEN COMPLETE    CLINICAL HISTORY:  , Unspecified abdominal pain.    COMPARISON:  None available    FINDINGS:  Multiple sonographic images reveal the pancreas to be less than optimally visualized.  The visualized portions of the aorta and inferior vena cava are normal in size. The gallbladder demonstrate multiple gallstones.  The gallbladder wall is not thickened.  No pericholecystic fluid is seen.  The gallbladder is mildly distended..  Romero sign is positive the common bile duct is not dilated. The liver is normal in size. Hepatic parenchyma is grossly within normal limits. No free fluid collection is seen. The kidneys are relatively symmetric in size.  No hydronephrosis is identified. The spleen is normal in size.                                       Medications - No data to display  Medical Decision Making:   Initial Assessment:   Patient came yesterday and left without being seen by blood work was drawn urine was clear ALT AST was slightly elevated CBC was benign.  Pregnancy test was negative  Black female awake oriented not toxic appearing adequate historian  Ear nose mouth throat unremarkable heart is regular rate and rhythm lungs are clear abdomen minimum direct  epigastric tenderness even less right and left upper quadrant tenderness no lower quadrant tenderness no rebound no CVA tenderness.  Extremities without clubbing or cyanosis edema neurological seems appropriate  Differential Diagnosis:   Biliary colic, gastritis, abdominal pain related to hormones nor plant, ectopic pregnancy, dysuria cystitis, hepatitis, pancreatitis, chronic abdominal pain unknown etiology  Clinical Tests:   Lab Tests: Ordered and Reviewed       <> Summary of Lab: Lab work from today looks like a contaminated clean catch with moderate squamous epithelial cells large amount of mucus few bacteria 21-50 reds and 51-99 whites.  Concentrated 1.03 negative nitrates 1+ leukocyte esterase.  Culture is being performed    Lab work done yesterday showed a white count of 9.71.  Platelet count of 369491 hemoglobin 11.8 hematocrit 36.9 lipase of 14 ALT AST is 65 ALT of 84 glucose of 107 otherwise chemistries were benign  ED Management:  Ultrasound is ordered.  After evaluation explained to the patient that the emergency department will look for any emergency problem this been going on for months we may not find a specific etiology  MDM  Problems addressed  Co-morbidities and/or factors adding to the complexity or risk for the patient:  None known  Problems addressed:  Abdominal pain for 7-8 months intermittent nausea and vomiting  Acute problem/illness or progression/exacerbation of chronic problem with potential threat to life/bodily dysfunction?:  Abdominal pain intermittent nausea and vomiting  Differential diagnoses/problems considered: see above     Amount and/or Complexity of Data Reviewed  Independent Historian: none (see above for summary)  External Data Reviewed: notes from previous ED visits and prior labs (see above for summary)  Risk and benefits of testing: discussed   Labs: Labs: ordered and reviewed  Radiology:Radiology: ordered and independent interpretation performed (see above or ED  course)  ECG/medicine tests:Radiology: ordered and independent interpretation performed (see above or ED course)  none    Risk  Prescription drug management   Shared decision making     Critical Care  none            ED Course as of 05/17/23 1309   Wed May 17, 2023   1305 I talked to the patient about gallstones I talked to the patient about biliary colic.  Patient tells me non not hurting right now said yes man when the stones move the not blocking anything the gallbladder they do not hurt I explained to her the importance of following up to see about an elective gallbladder removal before it makes her sick she is voiced her understanding.  She wanted know what causes gallstones I explained to her aside from sickle cell disease patients who get them very early.  I do not know of any pre-existing medical condition at least to this.  But I am not a gastroenterologist or surgeon [DM]      ED Course User Index  [DM] Julio C Dillard MD                 Clinical Impression:   Final diagnoses:  [R10.9] Abdominal pain  [K80.50] Biliary colic (Primary)  [K80.20] Calculus of gallbladder without cholecystitis without obstruction        ED Disposition Condition    Discharge Stable          ED Prescriptions    None       Follow-up Information       Follow up With Specialties Details Why Contact Info    Rose Duque MD Family Medicine In 2 days If symptoms worsen [address]  [place] [address]  [phone]      DIAMOND Barnes MD General Surgery  This is the general surgeon on-call the type of physician they can remove your gallbladder [address] [place] [address]  [phone]               Julio C Dillard MD  05/17/23 1310

## 2023-05-19 LAB — BACTERIA UR CULT: ABNORMAL

## 2023-07-25 DIAGNOSIS — O03.9 MISCARRIAGE: Primary | ICD-10-CM

## 2023-07-25 DIAGNOSIS — O03.89: ICD-10-CM

## 2023-07-28 ENCOUNTER — HOSPITAL ENCOUNTER (OUTPATIENT)
Dept: RADIOLOGY | Facility: HOSPITAL | Age: 28
Discharge: HOME | End: 2023-07-28
Attending: PEDIATRICS
Payer: MEDICAID

## 2023-07-28 DIAGNOSIS — O03.89: ICD-10-CM

## 2023-07-28 DIAGNOSIS — O03.9 MISCARRIAGE: ICD-10-CM

## 2023-07-28 PROCEDURE — 76856 US EXAM PELVIC COMPLETE: CPT | Mod: TC

## 2024-05-22 NOTE — Clinical Note
"Quintin Rodriguezhector Heredia was seen and treated in our emergency department on 5/17/2023.  She may return to school on 05/18/2023.      If you have any questions or concerns, please don't hesitate to call.      Julio C Dillard MD" Telephone call to pt & wife was notified of message below  Patient's wife  notified.

## 2024-05-29 ENCOUNTER — HOSPITAL ENCOUNTER (EMERGENCY)
Facility: HOSPITAL | Age: 29
Discharge: HOME | End: 2024-05-29
Attending: INTERNAL MEDICINE
Payer: MEDICAID

## 2024-05-29 VITALS
HEIGHT: 62 IN | HEART RATE: 91 BPM | SYSTOLIC BLOOD PRESSURE: 132 MMHG | RESPIRATION RATE: 16 BRPM | OXYGEN SATURATION: 98 % | TEMPERATURE: 98 F | BODY MASS INDEX: 27.6 KG/M2 | WEIGHT: 150 LBS | DIASTOLIC BLOOD PRESSURE: 89 MMHG

## 2024-05-29 DIAGNOSIS — K04.7 DENTAL ABSCESS: Primary | ICD-10-CM

## 2024-05-29 PROCEDURE — 96372 THER/PROPH/DIAG INJ SC/IM: CPT | Performed by: NURSE PRACTITIONER

## 2024-05-29 PROCEDURE — 99284 EMERGENCY DEPT VISIT MOD MDM: CPT | Mod: 25

## 2024-05-29 PROCEDURE — 63600175 PHARM REV CODE 636 W HCPCS: Performed by: NURSE PRACTITIONER

## 2024-05-29 RX ORDER — TRAMADOL HYDROCHLORIDE 50 MG/1
50 TABLET ORAL EVERY 6 HOURS PRN
Qty: 12 TABLET | Refills: 0 | Status: SHIPPED | OUTPATIENT
Start: 2024-05-29 | End: 2024-05-29

## 2024-05-29 RX ORDER — AMOXICILLIN 875 MG/1
875 TABLET, FILM COATED ORAL 2 TIMES DAILY
Qty: 20 TABLET | Refills: 0 | Status: SHIPPED | OUTPATIENT
Start: 2024-05-29 | End: 2024-06-08

## 2024-05-29 RX ORDER — CHLORHEXIDINE GLUCONATE ORAL RINSE 1.2 MG/ML
15 SOLUTION DENTAL 2 TIMES DAILY
Qty: 420 ML | Refills: 0 | Status: SHIPPED | OUTPATIENT
Start: 2024-05-29 | End: 2024-06-12

## 2024-05-29 RX ORDER — KETOROLAC TROMETHAMINE 30 MG/ML
30 INJECTION, SOLUTION INTRAMUSCULAR; INTRAVENOUS
Status: COMPLETED | OUTPATIENT
Start: 2024-05-29 | End: 2024-05-29

## 2024-05-29 RX ORDER — IBUPROFEN 800 MG/1
800 TABLET ORAL EVERY 6 HOURS PRN
Qty: 20 TABLET | Refills: 0 | Status: SHIPPED | OUTPATIENT
Start: 2024-05-29

## 2024-05-29 RX ORDER — AMOXICILLIN 875 MG/1
875 TABLET, FILM COATED ORAL 2 TIMES DAILY
Qty: 20 TABLET | Refills: 0 | Status: SHIPPED | OUTPATIENT
Start: 2024-05-29 | End: 2024-05-29

## 2024-05-29 RX ORDER — IBUPROFEN 800 MG/1
800 TABLET ORAL EVERY 6 HOURS PRN
Qty: 20 TABLET | Refills: 0 | Status: SHIPPED | OUTPATIENT
Start: 2024-05-29 | End: 2024-05-29

## 2024-05-29 RX ORDER — CHLORHEXIDINE GLUCONATE ORAL RINSE 1.2 MG/ML
15 SOLUTION DENTAL 2 TIMES DAILY
Qty: 420 ML | Refills: 0 | Status: SHIPPED | OUTPATIENT
Start: 2024-05-29 | End: 2024-05-29

## 2024-05-29 RX ORDER — TRAMADOL HYDROCHLORIDE 50 MG/1
50 TABLET ORAL EVERY 6 HOURS PRN
Qty: 12 TABLET | Refills: 0 | Status: SHIPPED | OUTPATIENT
Start: 2024-05-29

## 2024-05-29 RX ADMIN — KETOROLAC TROMETHAMINE 30 MG: 30 INJECTION, SOLUTION INTRAMUSCULAR; INTRAVENOUS at 05:05

## 2024-05-29 NOTE — Clinical Note
"Quintin Ceron" Heredia was seen and treated in our emergency department on 5/29/2024.  She may return to work on 05/30/2024.       If you have any questions or concerns, please don't hesitate to call.       LPN    "

## 2024-05-29 NOTE — ED PROVIDER NOTES
Encounter Date: 5/29/2024       History     Chief Complaint   Patient presents with    Dental Pain     C/o L sided dental pain since yesterday.     Patient is a 28-year-old female who presents emerged department complaints of left lower dental pain x2 days.  States this on the left side radiates down the lower jaw.  Reports pain with eating chewing and even drinking.  States she thinks a piece of her crown came loose at some point and then she noticed his whole then subsequent pain.  She denies any fevers chills.  She denies any other complaints or associated symptoms at this time.      Review of patient's allergies indicates:  No Known Allergies  Past Medical History:   Diagnosis Date    COVID-19 01/06/2022     No past surgical history on file.  No family history on file.  Social History     Tobacco Use    Smoking status: Never    Smokeless tobacco: Never   Substance Use Topics    Alcohol use: Never    Drug use: Never     Review of Systems   Constitutional:  Negative for activity change, appetite change and fever.   HENT:  Positive for dental problem. Negative for sore throat.    Respiratory:  Negative for apnea, chest tightness and shortness of breath.    Cardiovascular:  Negative for chest pain.   Gastrointestinal:  Negative for nausea.   Genitourinary:  Negative for dysuria.   Musculoskeletal:  Negative for back pain.   Skin:  Negative for rash.   Neurological:  Negative for weakness.   Hematological:  Does not bruise/bleed easily.   All other systems reviewed and are negative.      Physical Exam     Initial Vitals [05/29/24 1535]   BP Pulse Resp Temp SpO2   (!) 145/90 92 16 98.3 °F (36.8 °C) 99 %      MAP       --         Physical Exam    Nursing note and vitals reviewed.  Constitutional: Vital signs are normal. She appears well-developed and well-nourished.  Non-toxic appearance. She does not have a sickly appearance.   HENT:   Head: Normocephalic and atraumatic.   Right Ear: External ear normal.   Left Ear:  External ear normal.   Swelling to the lower gum around the left rear molar.  Partial removal of filling noted to the tooth.  No major fluctuance concerning for abscess.   Eyes: Conjunctivae, EOM and lids are normal. Lids are everted and swept, no foreign bodies found.   Neck: Trachea normal and phonation normal. Neck supple. No thyroid mass and no thyromegaly present.   Normal range of motion.   Full passive range of motion without pain.     Cardiovascular:  Normal rate, regular rhythm, S1 normal, S2 normal, normal heart sounds, intact distal pulses and normal pulses.           Musculoskeletal:      Cervical back: Full passive range of motion without pain, normal range of motion and neck supple.     Lymphadenopathy:     She has no cervical adenopathy.   Neurological: She is alert and oriented to person, place, and time. She has normal strength and normal reflexes.   Skin: Skin is warm, dry and intact. Capillary refill takes less than 2 seconds.   Psychiatric: She has a normal mood and affect. Her speech is normal and behavior is normal. Judgment normal. Cognition and memory are normal.         ED Course   Procedures  Labs Reviewed - No data to display       Imaging Results    None          Medications   ketorolac injection 30 mg (30 mg Intramuscular Given 5/29/24 6878)     Medical Decision Making  Patient is a 28-year-old female who presents emerged department complaints of left lower dental pain x2 days.  States this on the left side radiates down the lower jaw.  Reports pain with eating chewing and even drinking.  States she thinks a piece of her crown came loose at some point and then she noticed his whole then subsequent pain.  She denies any fevers chills.  She denies any other complaints or associated symptoms at this time.    Problems Addressed:  Dental abscess: acute illness or injury     Details: With a dental feeling removed concerns for abscess developing into the tooth.  No signs of major abscess but  will started on antibiotics to cover.  Will send chlorhexidine for swish and spit.  Will give anti-inflammatories and tramadol as needed for any p.r.n. severe pain.  So for opioid practice discussed.    Risk  Prescription drug management.                                      Clinical Impression:  Final diagnoses:  [K04.7] Dental abscess (Primary)          ED Disposition Condition    Discharge Stable          ED Prescriptions       Medication Sig Dispense Start Date End Date Auth. Provider    amoxicillin (AMOXIL) 875 MG tablet Take 1 tablet (875 mg total) by mouth 2 (two) times daily. for 10 days 20 tablet 5/29/2024 6/8/2024 Freedom Thomas FNP    chlorhexidine (PERIDEX) 0.12 % solution Use as directed 15 mLs in the mouth or throat 2 (two) times daily. for 14 days 420 mL 5/29/2024 6/12/2024 Freedom Thomas FNP    ibuprofen (ADVIL,MOTRIN) 800 MG tablet Take 1 tablet (800 mg total) by mouth every 6 (six) hours as needed for Pain. 20 tablet 5/29/2024 -- Freedom Thomas FNP    traMADoL (ULTRAM) 50 mg tablet Take 1 tablet (50 mg total) by mouth every 6 (six) hours as needed for Pain. 12 tablet 5/29/2024 -- Freedom Thomas FNP          Follow-up Information       Follow up With Specialties Details Why Contact Info    Rose Duque MD Family Medicine Schedule an appointment as soon as possible for a visit in 3 days For ER Follow Up. [address] [place] [address]  [phone]               Freedom Thomas FNP  05/29/24 9566

## 2024-06-16 ENCOUNTER — HOSPITAL ENCOUNTER (EMERGENCY)
Facility: HOSPITAL | Age: 29
Discharge: HOME | End: 2024-06-16
Attending: EMERGENCY MEDICINE
Payer: MEDICAID

## 2024-06-16 VITALS
TEMPERATURE: 98 F | HEART RATE: 70 BPM | SYSTOLIC BLOOD PRESSURE: 118 MMHG | OXYGEN SATURATION: 100 % | DIASTOLIC BLOOD PRESSURE: 72 MMHG | WEIGHT: 207 LBS | RESPIRATION RATE: 18 BRPM | BODY MASS INDEX: 31.37 KG/M2 | HEIGHT: 68 IN

## 2024-06-16 DIAGNOSIS — R10.11 RIGHT UPPER QUADRANT ABDOMINAL PAIN: Primary | ICD-10-CM

## 2024-06-16 LAB
ALBUMIN SERPL-MCNC: 3.7 G/DL (ref 3.5–5)
ALBUMIN/GLOB SERPL: 0.9 RATIO (ref 1.1–2)
ALP SERPL-CCNC: 89 UNIT/L (ref 40–150)
ALT SERPL-CCNC: 11 UNIT/L (ref 0–55)
ANION GAP SERPL CALC-SCNC: 7 MEQ/L
AST SERPL-CCNC: 14 UNIT/L (ref 5–34)
B-HCG UR QL: NEGATIVE
BACTERIA #/AREA URNS AUTO: ABNORMAL /HPF
BASOPHILS # BLD AUTO: 0.02 X10(3)/MCL
BASOPHILS NFR BLD AUTO: 0.3 %
BILIRUB SERPL-MCNC: 0.3 MG/DL
BILIRUB UR QL STRIP.AUTO: NEGATIVE
BUN SERPL-MCNC: 8 MG/DL (ref 7–18.7)
CALCIUM SERPL-MCNC: 9.5 MG/DL (ref 8.4–10.2)
CHLORIDE SERPL-SCNC: 105 MMOL/L (ref 98–107)
CLARITY UR: CLEAR
CO2 SERPL-SCNC: 26 MMOL/L (ref 22–29)
COLOR UR AUTO: YELLOW
CREAT SERPL-MCNC: 0.73 MG/DL (ref 0.55–1.02)
CREAT/UREA NIT SERPL: 11
EOSINOPHIL # BLD AUTO: 0.19 X10(3)/MCL (ref 0–0.9)
EOSINOPHIL NFR BLD AUTO: 2.4 %
ERYTHROCYTE [DISTWIDTH] IN BLOOD BY AUTOMATED COUNT: 12.7 % (ref 11.5–17)
GFR SERPLBLD CREATININE-BSD FMLA CKD-EPI: >60 ML/MIN/1.73/M2
GLOBULIN SER-MCNC: 4 GM/DL (ref 2.4–3.5)
GLUCOSE SERPL-MCNC: 73 MG/DL (ref 74–100)
GLUCOSE UR QL STRIP: NEGATIVE
HCT VFR BLD AUTO: 37.4 % (ref 37–47)
HGB BLD-MCNC: 12.4 G/DL (ref 12–16)
HGB UR QL STRIP: ABNORMAL
IMM GRANULOCYTES # BLD AUTO: 0.02 X10(3)/MCL (ref 0–0.04)
IMM GRANULOCYTES NFR BLD AUTO: 0.3 %
KETONES UR QL STRIP: NEGATIVE
LEUKOCYTE ESTERASE UR QL STRIP: NEGATIVE
LIPASE SERPL-CCNC: 12 U/L
LYMPHOCYTES # BLD AUTO: 2.7 X10(3)/MCL (ref 0.6–4.6)
LYMPHOCYTES NFR BLD AUTO: 33.9 %
MCH RBC QN AUTO: 30.9 PG (ref 27–31)
MCHC RBC AUTO-ENTMCNC: 33.2 G/DL (ref 33–36)
MCV RBC AUTO: 93.3 FL (ref 80–94)
MONOCYTES # BLD AUTO: 0.52 X10(3)/MCL (ref 0.1–1.3)
MONOCYTES NFR BLD AUTO: 6.5 %
MUCOUS THREADS URNS QL MICRO: ABNORMAL /LPF
NEUTROPHILS # BLD AUTO: 4.52 X10(3)/MCL (ref 2.1–9.2)
NEUTROPHILS NFR BLD AUTO: 56.6 %
NITRITE UR QL STRIP: NEGATIVE
PH UR STRIP: 6 [PH]
PLATELET # BLD AUTO: 314 X10(3)/MCL (ref 130–400)
PMV BLD AUTO: 9.3 FL (ref 7.4–10.4)
POTASSIUM SERPL-SCNC: 3.9 MMOL/L (ref 3.5–5.1)
PROT SERPL-MCNC: 7.7 GM/DL (ref 6.4–8.3)
PROT UR QL STRIP: NEGATIVE
RBC # BLD AUTO: 4.01 X10(6)/MCL (ref 4.2–5.4)
RBC #/AREA URNS AUTO: ABNORMAL /HPF
SODIUM SERPL-SCNC: 138 MMOL/L (ref 136–145)
SP GR UR STRIP.AUTO: 1.02 (ref 1–1.03)
SQUAMOUS #/AREA URNS AUTO: ABNORMAL /HPF
UROBILINOGEN UR STRIP-ACNC: 0.2
WBC # BLD AUTO: 7.97 X10(3)/MCL (ref 4.5–11.5)
WBC #/AREA URNS AUTO: ABNORMAL /HPF

## 2024-06-16 PROCEDURE — 81003 URINALYSIS AUTO W/O SCOPE: CPT | Performed by: EMERGENCY MEDICINE

## 2024-06-16 PROCEDURE — 83690 ASSAY OF LIPASE: CPT | Performed by: EMERGENCY MEDICINE

## 2024-06-16 PROCEDURE — 80053 COMPREHEN METABOLIC PANEL: CPT | Performed by: EMERGENCY MEDICINE

## 2024-06-16 PROCEDURE — 99285 EMERGENCY DEPT VISIT HI MDM: CPT | Mod: 25

## 2024-06-16 PROCEDURE — 63600175 PHARM REV CODE 636 W HCPCS: Performed by: EMERGENCY MEDICINE

## 2024-06-16 PROCEDURE — 25500020 PHARM REV CODE 255: Performed by: EMERGENCY MEDICINE

## 2024-06-16 PROCEDURE — 85025 COMPLETE CBC W/AUTO DIFF WBC: CPT | Performed by: EMERGENCY MEDICINE

## 2024-06-16 PROCEDURE — 96374 THER/PROPH/DIAG INJ IV PUSH: CPT | Mod: 59

## 2024-06-16 PROCEDURE — 81025 URINE PREGNANCY TEST: CPT | Performed by: EMERGENCY MEDICINE

## 2024-06-16 RX ORDER — KETOROLAC TROMETHAMINE 30 MG/ML
30 INJECTION, SOLUTION INTRAMUSCULAR; INTRAVENOUS
Status: COMPLETED | OUTPATIENT
Start: 2024-06-16 | End: 2024-06-16

## 2024-06-16 RX ADMIN — KETOROLAC TROMETHAMINE 30 MG: 30 INJECTION, SOLUTION INTRAMUSCULAR; INTRAVENOUS at 08:06

## 2024-06-16 RX ADMIN — IOPAMIDOL 100 ML: 755 INJECTION, SOLUTION INTRAVENOUS at 07:06

## 2024-06-16 NOTE — ED PROVIDER NOTES
Encounter Date: 6/16/2024       History     Chief Complaint   Patient presents with    Abdominal Pain     Pt c/o abd pain, r flank pain and back pain starting last night.     HPI  28-year-old female history of biliary colic now with complaints of right upper quadrant/midepigastric abdominal pain with radiation to right flank since 9:00 p.m. last night.  Patient states the pain is now 7/10 with no exacerbating or alleviating factors.  Patient did take Tylenol with no relief.  Patient states he did have 1 other episode of pain the same area one-week ago but she did not seek medical attention.  Patient states 1 year ago she did seek medical attention at this facility and had an ultrasound was done which showed biliary colic and was referred to General surgery but did not follow up.  Patient denies associated fever, chills, chest pain, shortness of breath, dysuria, hematuria, nausea, vomiting, diarrhea, vaginal discharge or bleeding.  Patient states she is not pregnant and even took a home pregnancy test which was negative last night.  Patient does not have a previous history of appendectomy, diverticulitis, or peptic ulcer disease.  Review of patient's allergies indicates:  No Known Allergies  Past Medical History:   Diagnosis Date    COVID-19 01/06/2022     History reviewed. No pertinent surgical history.  No family history on file.  Social History     Tobacco Use    Smoking status: Never    Smokeless tobacco: Never   Substance Use Topics    Alcohol use: Never    Drug use: Never     Review of Systems   All other systems reviewed and are negative.      Physical Exam     Initial Vitals [06/16/24 0600]   BP Pulse Resp Temp SpO2   124/75 87 18 98.1 °F (36.7 °C) 100 %      MAP       --         Physical Exam    Nursing note and vitals reviewed.  Constitutional: She appears well-developed and well-nourished.   HENT:   Head: Normocephalic and atraumatic.   Eyes: Conjunctivae and EOM are normal. Pupils are equal, round, and  reactive to light.   Neck: Neck supple.   Normal range of motion.  Cardiovascular:  Normal rate, regular rhythm, normal heart sounds and intact distal pulses.           Pulmonary/Chest: Breath sounds normal.   Abdominal: Abdomen is soft. Bowel sounds are normal. There is abdominal tenderness.   Tender to palpation midepigastric and right upper quadrant with no rebound or guarding, no flank pain on palpation.   Musculoskeletal:         General: Normal range of motion.      Cervical back: Normal range of motion and neck supple.     Neurological: She is alert and oriented to person, place, and time. She has normal strength and normal reflexes.   Skin: Skin is warm and dry. Capillary refill takes less than 2 seconds.   Psychiatric: She has a normal mood and affect. Her behavior is normal. Judgment and thought content normal.         ED Course   Procedures  Labs Reviewed   COMPREHENSIVE METABOLIC PANEL - Abnormal; Notable for the following components:       Result Value    Glucose 73 (*)     Globulin 4.0 (*)     Albumin/Globulin Ratio 0.9 (*)     All other components within normal limits   URINALYSIS - Abnormal; Notable for the following components:    Blood, UA Trace-Intact (*)     All other components within normal limits   CBC WITH DIFFERENTIAL - Abnormal; Notable for the following components:    RBC 4.01 (*)     All other components within normal limits   URINALYSIS, MICROSCOPIC - Abnormal; Notable for the following components:    Bacteria, UA Few (*)     Mucous, UA Small (*)     Squamous Epithelial Cells, UA Many (*)     All other components within normal limits   LIPASE - Normal   HCG QUALITATIVE URINE - Normal   CBC W/ AUTO DIFFERENTIAL    Narrative:     The following orders were created for panel order CBC auto differential.  Procedure                               Abnormality         Status                     ---------                               -----------         ------                     CBC with  Differential[[id number]]        Abnormal            Final result                 Please view results for these tests on the individual orders.          Imaging Results              CT Abdomen Pelvis With IV Contrast NO Oral Contrast (Final result)  Result time 06/16/24 11:15:29      Final result by Jonnathan Key MD (06/16/24 11:15:29)                   Impression:    Impression:    1. No acute intraabdominal or pelvic solid organ or bowel pathology identified. Details and other findings as discussed above.      I concur with the preliminary report.      Electronically signed by: Jonnathan Key  Date:    06/16/2024  Time:    11:15               Narrative:    EXAMINATION:  CT ABDOMEN PELVIS WITH IV CONTRAST    CLINICAL HISTORY:  Abdominal pain, acute, nonlocalized;RUQ/epigastric AP;    TECHNIQUE:  Routine axial CT images of the abdomen were obtained 100 IV contrast.  No oral contrast was given.Coronal and Sagittal reformatted images were also obtained.    FINDINGS:  Technique: CT of the abdomen and pelvis was performed with axial images as well as sagittal and coronal reconstruction images with intravenous contrast.    Comparison: None available.    Clinical History: RUQ pain.    Dosage Information: Automated Exposure Control was utilized 588.86 mGy.cm.    Findings:    Lines and Tubes: None.    Thorax:    Lungs: The visualized lung bases appear unremarkable.    Pleura: No effusions or thickening.    Heart: The heart size is within normal limits.    Abdomen:    Abdominal Wall: No abdominal wall pathology is seen.    Biliary System: No extrahepatic biliary duct dilatation is seen.    Gallbladder: The gallbladder appears unremarkable.    Pancreas: The pancreas appears unremarkable.    Spleen: The spleen appears unremarkable.    Adrenals: The adrenal glands appear unremarkable.    Kidneys: The kidneys appear unremarkable with no stones cysts masses or hydronephrosis.    Aorta: The abdominal aorta appears  unremarkable.    Bowel:    Esophagus: The visualized esophagus appears unremarkable.    Stomach: The stomach appears unremarkable.    Duodenum: Unremarkable appearing duodenum.    Small Bowel: The small bowel appears unremarkable.    Colon: Nondistended.    Appendix: The appendix appears unremarkable and is seen on Image 92, Series 2 through Image 108, Series 2.    Peritoneum: No intraperitoneal free air or ascites is seen.    Pelvis:    Bladder: The bladder appears unremarkable.    Female:    Uterus: The uterus appears unremarkable.    Ovaries: The ovaries appear unremarkable with probable bilateral physiologic cysts.    Bony structures:    Dorsal Spine: The visualized dorsal spine appears unremarkable.    Bony Pelvis: The visualized bony structures of the pelvis appear unremarkable.                        Preliminary result by Washington Carrillo MD (06/16/24 07:58:50)                   Impression:    1. No acute intraabdominal or pelvic solid organ or bowel pathology identified. Details and other findings as discussed above.               Narrative:    START OF REPORT:  Technique: CT of the abdomen and pelvis was performed with axial images as well as sagittal and coronal reconstruction images with intravenous contrast.    Comparison: None available.    Clinical History: RUQ pain.    Dosage Information: Automated Exposure Control was utilized 588.86 mGy.cm.    Findings:  Lines and Tubes: None.  Thorax:  Lungs: The visualized lung bases appear unremarkable.  Pleura: No effusions or thickening.  Heart: The heart size is within normal limits.  Abdomen:  Abdominal Wall: No abdominal wall pathology is seen.  Biliary System: No extrahepatic biliary duct dilatation is seen.  Gallbladder: The gallbladder appears unremarkable.  Pancreas: The pancreas appears unremarkable.  Spleen: The spleen appears unremarkable.  Adrenals: The adrenal glands appear unremarkable.  Kidneys: The kidneys appear unremarkable with no stones cysts  masses or hydronephrosis.  Aorta: The abdominal aorta appears unremarkable.  Bowel:  Esophagus: The visualized esophagus appears unremarkable.  Stomach: The stomach appears unremarkable.  Duodenum: Unremarkable appearing duodenum.  Small Bowel: The small bowel appears unremarkable.  Colon: Nondistended.  Appendix: The appendix appears unremarkable and is seen on Image 92, Series 2 through Image 108, Series 2.  Peritoneum: No intraperitoneal free air or ascites is seen.    Pelvis:  Bladder: The bladder appears unremarkable.  Female:  Uterus: The uterus appears unremarkable.  Ovaries: The ovaries appear unremarkable with probable bilateral physiologic cysts.    Bony structures:  Dorsal Spine: The visualized dorsal spine appears unremarkable.  Bony Pelvis: The visualized bony structures of the pelvis appear unremarkable.                                         Medications   iopamidoL (ISOVUE-370) injection 100 mL (100 mLs Intravenous Given 6/16/24 4115)   ketorolac injection 30 mg (30 mg Intravenous Given 6/16/24 5531)     Medical Decision Making  Amount and/or Complexity of Data Reviewed  Labs: ordered.  Radiology: ordered.    Risk  Prescription drug management.       28-year-old female history of biliary colic now with complaints of right upper quadrant pain and midepigastric pain with radiation to right flank since 9:00 p.m. last night.  Patient states the pain is now improved after 30 mg of Toradol IV.  Patient is minimally tender to palpation in the right upper quadrant and midepigastric area.  Labs essentially WNL including a normal lipase, LFTs, WBC count and hemoglobin.  HCG negative.  UA with no evidence of infection.  CT abdomen and pelvis with no acute abnormalities, see full report.  Ultrasound not available at this facility at this time.  Patient feeling much better and requesting to be discharged home to be follow up with her primary care physician with continued referral to her general surgeon as  previously recommended.  Patient advised she can return anytime p.r.n. worsening symptoms or any other concern.  Patient continue Tylenol Motrin for pain as needed.                               Clinical Impression:  Final diagnoses:  [R10.11] Right upper quadrant abdominal pain - Improved (Primary)          ED Disposition Condition    Discharge Stable          ED Prescriptions    None       Follow-up Information       Follow up With Specialties Details Why Contact Info    Rose Duque MD Family Medicine Schedule an appointment as soon as possible for a visit in 2 days Follow up with Dr. Duque for outpatient ultrasound and possible referral to General surgery for previous history of biliary colic [address]  [facility] [address]  [phone]               Stoney Thompson MD  06/17/24 0600       Stoney Thompson MD  06/17/24 0600

## 2024-06-18 DIAGNOSIS — K80.20 CALCULUS OF GALLBLADDER WITHOUT CHOLECYSTITIS WITHOUT OBSTRUCTION: Primary | ICD-10-CM

## 2024-06-18 DIAGNOSIS — R10.9 UNSPECIFIED ABDOMINAL PAIN: Primary | ICD-10-CM

## 2024-07-03 ENCOUNTER — HOSPITAL ENCOUNTER (OUTPATIENT)
Dept: RADIOLOGY | Facility: HOSPITAL | Age: 29
Discharge: HOME | End: 2024-07-03
Attending: PEDIATRICS
Payer: MEDICAID

## 2024-07-03 DIAGNOSIS — K80.20 CALCULUS OF GALLBLADDER WITHOUT CHOLECYSTITIS WITHOUT OBSTRUCTION: ICD-10-CM

## 2024-07-03 DIAGNOSIS — R10.9 UNSPECIFIED ABDOMINAL PAIN: ICD-10-CM

## 2024-07-03 PROCEDURE — 76856 US EXAM PELVIC COMPLETE: CPT | Mod: TC

## 2024-07-03 PROCEDURE — 76700 US EXAM ABDOM COMPLETE: CPT | Mod: TC

## 2025-05-06 ENCOUNTER — HOSPITAL ENCOUNTER (EMERGENCY)
Facility: HOSPITAL | Age: 30
Discharge: HOME | End: 2025-05-06
Attending: INTERNAL MEDICINE
Payer: MEDICAID

## 2025-05-06 VITALS
HEIGHT: 68 IN | OXYGEN SATURATION: 98 % | SYSTOLIC BLOOD PRESSURE: 139 MMHG | HEART RATE: 77 BPM | DIASTOLIC BLOOD PRESSURE: 82 MMHG | WEIGHT: 221.63 LBS | RESPIRATION RATE: 18 BRPM | BODY MASS INDEX: 33.59 KG/M2 | TEMPERATURE: 98 F

## 2025-05-06 DIAGNOSIS — K59.00 CONSTIPATION, UNSPECIFIED CONSTIPATION TYPE: Primary | ICD-10-CM

## 2025-05-06 DIAGNOSIS — R10.84 GENERALIZED ABDOMINAL PAIN: ICD-10-CM

## 2025-05-06 LAB
ALBUMIN SERPL-MCNC: 3.4 G/DL (ref 3.5–5)
ALBUMIN/GLOB SERPL: 0.9 RATIO (ref 1.1–2)
ALP SERPL-CCNC: 86 UNIT/L (ref 40–150)
ALT SERPL-CCNC: 9 UNIT/L (ref 0–55)
ANION GAP SERPL CALC-SCNC: 4 MEQ/L
AST SERPL-CCNC: 13 UNIT/L (ref 11–45)
B-HCG UR QL: NEGATIVE
BACTERIA #/AREA URNS AUTO: ABNORMAL /HPF
BASOPHILS # BLD AUTO: 0.02 X10(3)/MCL
BASOPHILS NFR BLD AUTO: 0.2 %
BILIRUB SERPL-MCNC: 0.1 MG/DL
BILIRUB UR QL STRIP.AUTO: NEGATIVE
BUN SERPL-MCNC: 9 MG/DL (ref 7–18.7)
CALCIUM SERPL-MCNC: 9 MG/DL (ref 8.4–10.2)
CHLORIDE SERPL-SCNC: 108 MMOL/L (ref 98–107)
CLARITY UR: CLEAR
CO2 SERPL-SCNC: 28 MMOL/L (ref 22–29)
COLOR UR AUTO: YELLOW
CREAT SERPL-MCNC: 0.65 MG/DL (ref 0.55–1.02)
CREAT/UREA NIT SERPL: 14
EOSINOPHIL # BLD AUTO: 0.08 X10(3)/MCL (ref 0–0.9)
EOSINOPHIL NFR BLD AUTO: 0.9 %
ERYTHROCYTE [DISTWIDTH] IN BLOOD BY AUTOMATED COUNT: 13.8 % (ref 11.5–17)
GFR SERPLBLD CREATININE-BSD FMLA CKD-EPI: >60 ML/MIN/1.73/M2
GLOBULIN SER-MCNC: 4 GM/DL (ref 2.4–3.5)
GLUCOSE SERPL-MCNC: 89 MG/DL (ref 74–100)
GLUCOSE UR QL STRIP: NEGATIVE
HCT VFR BLD AUTO: 33.9 % (ref 37–47)
HGB BLD-MCNC: 11.2 G/DL (ref 12–16)
HGB UR QL STRIP: ABNORMAL
IMM GRANULOCYTES # BLD AUTO: 0.02 X10(3)/MCL (ref 0–0.04)
IMM GRANULOCYTES NFR BLD AUTO: 0.2 %
KETONES UR QL STRIP: NEGATIVE
LEUKOCYTE ESTERASE UR QL STRIP: NEGATIVE
LYMPHOCYTES # BLD AUTO: 2.65 X10(3)/MCL (ref 0.6–4.6)
LYMPHOCYTES NFR BLD AUTO: 29.2 %
MCH RBC QN AUTO: 30 PG (ref 27–31)
MCHC RBC AUTO-ENTMCNC: 33 G/DL (ref 33–36)
MCV RBC AUTO: 90.9 FL (ref 80–94)
MONOCYTES # BLD AUTO: 0.55 X10(3)/MCL (ref 0.1–1.3)
MONOCYTES NFR BLD AUTO: 6.1 %
MUCOUS THREADS URNS QL MICRO: ABNORMAL /LPF
NEUTROPHILS # BLD AUTO: 5.75 X10(3)/MCL (ref 2.1–9.2)
NEUTROPHILS NFR BLD AUTO: 63.4 %
NITRITE UR QL STRIP: NEGATIVE
NRBC BLD AUTO-RTO: 0 %
PH UR STRIP: 6.5 [PH]
PLATELET # BLD AUTO: 303 X10(3)/MCL (ref 130–400)
PMV BLD AUTO: 9.6 FL (ref 7.4–10.4)
POTASSIUM SERPL-SCNC: 4 MMOL/L (ref 3.5–5.1)
PROT SERPL-MCNC: 7.4 GM/DL (ref 6.4–8.3)
PROT UR QL STRIP: NEGATIVE
RBC # BLD AUTO: 3.73 X10(6)/MCL (ref 4.2–5.4)
RBC #/AREA URNS AUTO: ABNORMAL /HPF
SODIUM SERPL-SCNC: 140 MMOL/L (ref 136–145)
SP GR UR STRIP.AUTO: 1.02 (ref 1–1.03)
SQUAMOUS #/AREA URNS AUTO: ABNORMAL /HPF
UROBILINOGEN UR STRIP-ACNC: 0.2
WBC # BLD AUTO: 9.07 X10(3)/MCL (ref 4.5–11.5)
WBC #/AREA URNS AUTO: ABNORMAL /HPF

## 2025-05-06 PROCEDURE — 81003 URINALYSIS AUTO W/O SCOPE: CPT | Performed by: INTERNAL MEDICINE

## 2025-05-06 PROCEDURE — 80053 COMPREHEN METABOLIC PANEL: CPT | Performed by: INTERNAL MEDICINE

## 2025-05-06 PROCEDURE — 99284 EMERGENCY DEPT VISIT MOD MDM: CPT | Mod: 25

## 2025-05-06 PROCEDURE — 81025 URINE PREGNANCY TEST: CPT | Performed by: INTERNAL MEDICINE

## 2025-05-06 PROCEDURE — 85025 COMPLETE CBC W/AUTO DIFF WBC: CPT | Performed by: INTERNAL MEDICINE

## 2025-05-06 PROCEDURE — 25000003 PHARM REV CODE 250: Performed by: INTERNAL MEDICINE

## 2025-05-06 RX ADMIN — LACTULOSE 20 G: 10 SOLUTION ORAL at 07:05

## 2025-05-07 NOTE — ED PROVIDER NOTES
Encounter Date: 5/6/2025       History     Chief Complaint   Patient presents with    Abdominal Pain     Pt c/o upper abd pain and back pain x 3 days. Pt reports relief with ibuprofen. Denies OTC medications today. Pt report that she was supposed to have her gallbladder removed 6 months ago but did not.      29-year-old black female states that she has a history of having gallstones but she was too nervous to get it taken out a proximally 8 -9 months ago noted that about 3 days ago she had been having vague intermittent abdominal pain that began becoming more severe today and causing some upper back pain so she came to the ER for evaluation.  When I asked directly she has not had a bowel movement in 2 days      Review of patient's allergies indicates:  No Known Allergies  Past Medical History:   Diagnosis Date    Abdominal pain     Abnormal findings on diagnostic imaging of abdomen     COVID-19 01/06/2022    Epigastric abdominal pain     Gallstones     Lesion of liver     left lobe    Ovarian cyst     bilateral    Positive Romero's Sign     Right upper quadrant abdominal pain      Past Surgical History:   Procedure Laterality Date    DILATION AND CURETTAGE OF UTERUS       Family History   Problem Relation Name Age of Onset    No Known Problems Mother      No Known Problems Father       Social History[1]  Review of Systems   Constitutional: Negative.  Negative for activity change, appetite change, chills, diaphoresis, fatigue, fever and unexpected weight change.   HENT: Negative.  Negative for congestion, dental problem, drooling, ear discharge, ear pain, facial swelling, hearing loss, mouth sores, nosebleeds, postnasal drip, rhinorrhea, sinus pressure, sinus pain, sneezing, sore throat, tinnitus, trouble swallowing and voice change.    Eyes: Negative.  Negative for photophobia, pain, discharge, redness, itching and visual disturbance.   Respiratory: Negative.  Negative for apnea, cough, choking, chest tightness,  shortness of breath, wheezing and stridor.    Cardiovascular: Negative.  Negative for chest pain, palpitations and leg swelling.   Gastrointestinal:  Positive for abdominal pain and constipation. Negative for abdominal distention, anal bleeding, blood in stool, diarrhea, nausea, rectal pain and vomiting.   Endocrine: Negative.  Negative for cold intolerance, heat intolerance, polydipsia, polyphagia and polyuria.   Genitourinary: Negative.  Negative for decreased urine volume, difficulty urinating, dyspareunia, dysuria, enuresis, flank pain, frequency, genital sores, hematuria, menstrual problem, pelvic pain, urgency, vaginal bleeding, vaginal discharge and vaginal pain.   Musculoskeletal: Negative.  Negative for arthralgias, back pain, gait problem, joint swelling, myalgias, neck pain and neck stiffness.   Skin: Negative.  Negative for color change, pallor, rash and wound.   Allergic/Immunologic: Negative.  Negative for environmental allergies, food allergies and immunocompromised state.   Neurological: Negative.  Negative for dizziness, tremors, seizures, syncope, facial asymmetry, speech difficulty, weakness, light-headedness, numbness and headaches.   Hematological: Negative.  Negative for adenopathy. Does not bruise/bleed easily.   Psychiatric/Behavioral: Negative.  Negative for agitation, behavioral problems, confusion, decreased concentration, dysphoric mood, hallucinations, self-injury, sleep disturbance and suicidal ideas. The patient is not nervous/anxious and is not hyperactive.    All other systems reviewed and are negative.      Physical Exam     Initial Vitals [05/06/25 1851]   BP Pulse Resp Temp SpO2   139/82 77 18 98.3 °F (36.8 °C) 98 %      MAP       --         Physical Exam    Nursing note and vitals reviewed.  Constitutional: She appears well-developed and well-nourished.   HENT:   Head: Normocephalic and atraumatic.   Eyes: Conjunctivae and EOM are normal.   Neck: Neck supple.   Normal range of  motion.  Cardiovascular:  Normal rate.           Pulmonary/Chest: Breath sounds normal.   Abdominal: Abdomen is soft. Bowel sounds are normal. There is abdominal tenderness.   No signs of acute abdomen     There is no rebound and no guarding.   Musculoskeletal:         General: Normal range of motion.      Cervical back: Normal range of motion and neck supple.     Neurological: She is alert and oriented to person, place, and time.   Skin: Skin is warm.   Psychiatric: She has a normal mood and affect.         ED Course   Procedures  Labs Reviewed   COMPREHENSIVE METABOLIC PANEL - Abnormal       Result Value    Sodium 140      Potassium 4.0      Chloride 108 (*)     CO2 28      Glucose 89      Blood Urea Nitrogen 9.0      Creatinine 0.65      Calcium 9.0      Protein Total 7.4      Albumin 3.4 (*)     Globulin 4.0 (*)     Albumin/Globulin Ratio 0.9 (*)     Bilirubin Total 0.1      ALP 86      ALT 9      AST 13      eGFR >60      Anion Gap 4.0      BUN/Creatinine Ratio 14     URINALYSIS, REFLEX TO URINE CULTURE - Abnormal    Color, UA Yellow      Appearance, UA Clear      Specific Gravity, UA 1.025      pH, UA 6.5      Protein, UA Negative      Glucose, UA Negative      Ketones, UA Negative      Blood, UA 1+ (*)     Bilirubin, UA Negative      Urobilinogen, UA 0.2      Nitrites, UA Negative      Leukocyte Esterase, UA Negative     CBC WITH DIFFERENTIAL - Abnormal    WBC 9.07      RBC 3.73 (*)     Hgb 11.2 (*)     Hct 33.9 (*)     MCV 90.9      MCH 30.0      MCHC 33.0      RDW 13.8      Platelet 303      MPV 9.6      Neut % 63.4      Lymph % 29.2      Mono % 6.1      Eos % 0.9      Basophil % 0.2      Imm Grans % 0.2      Neut # 5.75      Lymph # 2.65      Mono # 0.55      Eos # 0.08      Baso # 0.02      Imm Gran # 0.02      NRBC% 0.0     URINALYSIS, MICROSCOPIC - Abnormal    Bacteria, UA Occasional      Mucous, UA Moderate (*)     RBC, UA 3-5      WBC, UA None Seen      Squamous Epithelial Cells, UA Few (*)     PREGNANCY TEST, URINE RAPID - Normal    hCG Qualitative, Urine Negative     CBC W/ AUTO DIFFERENTIAL    Narrative:     The following orders were created for panel order CBC auto differential.  Procedure                               Abnormality         Status                     ---------                               -----------         ------                     CBC with Differential[[id number]]       Abnormal            Final result                 Please view results for these tests on the individual orders.          Imaging Results              X-Ray Abdomen Flat And Erect (Final result)  Result time 05/06/25 19:46:31      Final result by Marquise Salas MD (05/06/25 19:46:31)                   Impression:      No acute abnormalities are demonstrated.      Electronically signed by: Marquise Salas MD  Date:    05/06/2025  Time:    19:46               Narrative:    EXAMINATION:  XR ABDOMEN FLAT AND ERECT    CLINICAL HISTORY:  Unspecified abdominal pain    TECHNIQUE:  Flat and erect AP views of the abdomen were performed.    COMPARISON:  None    FINDINGS:  Bowel gas pattern is unremarkable.  No nephrolithiasis is seen.  There is a small to moderate amount of stool in the colon.                        Wet Read by Jake Best MD (05/06/25 19:44:26, [facility] - Emergency Dept, Emergency Medicine)    Nonspecific bowel gas pattern with no evidence of obstruction or perforation.  Gas and feces noted throughout the colon with feces actually seen in the rectal vault consistent with constipation                                     Medications   lactulose 20 gram/30 mL solution Soln 20 g (has no administration in time range)     Medical Decision Making  29-year-old black female with generalized abdominal pain.  Differential diagnosis includes but is not limited to cholecystitis, pancreatitis, gastroenteritis, diverticulitis, nephrolithiasis, pyelonephritis, bowel obstruction, fecal impaction,  "constipation, anxiety.  Patient's exam is completely benign with no signs of acute abdomen.  She is afebrile lab work is essentially normal x-ray shows large amount of feces and gas and actually stool in her rectal vault.  Will give a dose of lactulose discussed with her taken daily stool softeners high-fiber diet and drinking plenty of fluids and following up with the primary care she voiced understanding    Problems Addressed:  Constipation, unspecified constipation type: acute illness or injury  Generalized abdominal pain: self-limited or minor problem    Amount and/or Complexity of Data Reviewed  External Data Reviewed: labs, radiology and notes.  Labs: ordered. Decision-making details documented in ED Course.  Radiology: ordered and independent interpretation performed. Decision-making details documented in ED Course.    Risk  OTC drugs.  Prescription drug management.                                      Clinical Impression:  Final diagnoses:  [R10.84] Generalized abdominal pain  [K59.00] Constipation, unspecified constipation type (Primary)          ED Disposition Condition    Discharge Stable          ED Prescriptions    None       Follow-up Information       Follow up With Specialties Details Why Contact Info    Rose Duque MD Family Medicine In 3 days  [address] [place] [address]  [phone]            Portions of this note have been created with voice recognition software. Occasional "wrong-words" or "sound alike" substitutions may have occurred due to inherent limitations of voice software. Please read the note carefully and recognize, using context, word substitutions may have occurred.         [1]   Social History  Tobacco Use    Smoking status: Never    Smokeless tobacco: Never   Substance Use Topics    Alcohol use: Yes     Comment: occas    Drug use: Never        Jake Best MD  05/06/25 1953    "